# Patient Record
Sex: FEMALE | Race: WHITE | NOT HISPANIC OR LATINO | Employment: FULL TIME | URBAN - METROPOLITAN AREA
[De-identification: names, ages, dates, MRNs, and addresses within clinical notes are randomized per-mention and may not be internally consistent; named-entity substitution may affect disease eponyms.]

---

## 2017-09-29 ENCOUNTER — HOSPITAL ENCOUNTER (OUTPATIENT)
Dept: MAMMOGRAPHY | Facility: CLINIC | Age: 52
Discharge: HOME | End: 2017-09-29
Attending: FAMILY MEDICINE | Admitting: FAMILY MEDICINE
Payer: COMMERCIAL

## 2017-09-29 DIAGNOSIS — Z12.31 VISIT FOR SCREENING MAMMOGRAM: ICD-10-CM

## 2017-09-29 PROCEDURE — G0202 SCR MAMMO BI INCL CAD: HCPCS

## 2018-07-30 ENCOUNTER — OFFICE VISIT (OUTPATIENT)
Dept: FAMILY MEDICINE | Facility: CLINIC | Age: 53
End: 2018-07-30

## 2018-07-30 VITALS
BODY MASS INDEX: 41.66 KG/M2 | HEART RATE: 67 BPM | WEIGHT: 276 LBS | OXYGEN SATURATION: 99 % | TEMPERATURE: 98.2 F | SYSTOLIC BLOOD PRESSURE: 178 MMHG | DIASTOLIC BLOOD PRESSURE: 97 MMHG

## 2018-07-30 DIAGNOSIS — I10 ESSENTIAL HYPERTENSION, BENIGN: ICD-10-CM

## 2018-07-30 DIAGNOSIS — E66.01 MORBID OBESITY (H): ICD-10-CM

## 2018-07-30 LAB
ALBUMIN (URINE): ABNORMAL MG/DL
APPEARANCE UR: CLEAR
BACTERIA, UR: ABNORMAL
BILIRUB UR QL: ABNORMAL
CASTS/LPF: ABNORMAL
COLOR UR: YELLOW
EP/HPF: ABNORMAL
GLUCOSE URINE: ABNORMAL MG/DL
HGB UR QL: ABNORMAL
KETONES UR QL: ABNORMAL MG/DL
LEUKOCYTE ESTERASE - QUEST: ABNORMAL
MISC.: ABNORMAL
NITRITE UR QL STRIP: ABNORMAL
PH UR STRIP: 5.5 PH (ref 5–7)
RBC, UR MICRO: ABNORMAL (ref ?–2)
SP. GRAVITY: 1.02
UROBILINOGEN UR QL STRIP: 0.2 EU/DL (ref 0.2–1)
WBC, UR MICRO: ABNORMAL (ref ?–2)

## 2018-07-30 PROCEDURE — 99214 OFFICE O/P EST MOD 30 MIN: CPT | Performed by: FAMILY MEDICINE

## 2018-07-30 PROCEDURE — 36415 COLL VENOUS BLD VENIPUNCTURE: CPT | Performed by: FAMILY MEDICINE

## 2018-07-30 PROCEDURE — 93000 ELECTROCARDIOGRAM COMPLETE: CPT | Performed by: FAMILY MEDICINE

## 2018-07-30 PROCEDURE — 81001 URINALYSIS AUTO W/SCOPE: CPT | Performed by: FAMILY MEDICINE

## 2018-07-30 PROCEDURE — 80061 LIPID PANEL: CPT | Mod: 90 | Performed by: FAMILY MEDICINE

## 2018-07-30 PROCEDURE — 80048 BASIC METABOLIC PNL TOTAL CA: CPT | Mod: 90 | Performed by: FAMILY MEDICINE

## 2018-07-30 NOTE — MR AVS SNAPSHOT
"              After Visit Summary   7/30/2018    Michelle Grant    MRN: [id number]           Patient Information     Date Of Birth          1965        Visit Information        Provider Department      7/30/2018 4:15 PM Tara Cardenas MD [facility]        Today's Diagnoses     CARDIOVASCULAR SCREENING; LDL GOAL LESS THAN 160    -  1    Essential hypertension, benign        Morbid obesity (H)          Care Instructions    New Recombinant shingles vaccine (Shingrix): call your insurance for information on cost    Check with insurance : cologuard-     Consider buying an omron- blood cuff (large)                  Follow-ups after your visit        Who to contact     If you have questions or need follow up information about today's clinic visit or your schedule please contact [facility] directly at [phone].  Normal or non-critical lab and imaging results will be communicated to you by [employer]-Life Medicalhart, letter or phone within 4 business days after the clinic has received the results. If you do not hear from us within 7 days, please contact the clinic through [employer] or phone. If you have a critical or abnormal lab result, we will notify you by phone as soon as possible.  Submit refill requests through Coradiant or call your pharmacy and they will forward the refill request to us. Please allow 3 business days for your refill to be completed.          Additional Information About Your Visit        MyChart Information     Coradiant lets you send messages to your doctor, view your test results, renew your prescriptions, schedule appointments and more. To sign up, go to [email or web address] . Click on \"Log in\" on the left side of the screen, which will take you to the Welcome page. Then click on \"Sign up Now\" on the right side of the page.     You will be asked to enter the access code listed below, as well as some personal information. Please follow the directions to " create your username and password.     Your access code is: [email or web address]  Expires: 10/28/2018  4:06 PM     Your access code will  in 90 days. If you need help or a new code, please call your [place] clinic or [phone].        Care EveryWhere ID     This is your Care EveryWhere ID. This could be used by other organizations to access your [place] medical records  [id number]        Your Vitals Were     Pulse Temperature Pulse Oximetry BMI (Body Mass Index)          67 98.2  F (36.8  C) (Oral) 99% 41.66 kg/m2         Blood Pressure from Last 3 Encounters:   18 (!) 178/97   16 120/80   01/07/15 112/72    Weight from Last 3 Encounters:   18 125.2 kg (276 lb)   16 121.1 kg (267 lb)   01/07/15 115.2 kg (254 lb)              We Performed the Following     BASIC METABOLIC PANEL (QUEST)     EKG 12-lead complete w/read - Clinics     HCL  Urinalysis, Routine (BFP)     Lipid Profile     VENOUS COLLECTION        Primary Care Provider Office Phone # Fax #    Natasha Chen [phone]       [facility] [address] [address]        Equal Access to Services     RAZ AMARO : Hadii shelby ku hadasho Soomaali, waaxda luqadaha, qaybta kaalmada adeegyada, katie mccartney haygloria portillo . So [facility] [phone].    ATENCIÓN: Si habla español, tiene a mccarthy disposición servicios gratuitos de asistencia lingüística. Llame al [phone].    We comply with applicable federal civil rights laws and [place] laws. We do not discriminate on the basis of race, color, national origin, age, disability, sex, sexual orientation, or gender identity.            Thank you!     Thank you for choosing [facility]  for your care. Our goal is always to provide you with excellent care. Hearing back from our patients is one way we can continue to improve our services. Please take a few minutes to complete the written survey that you may receive in the mail  after your visit with us. Thank you!             Your Updated Medication List - Protect others around you: Learn how to safely use, store and throw away your medicines at www.disposemymeds.org.          This list is accurate as of 7/30/18  4:48 PM.  Always use your most recent med list.                   Brand Name Dispense Instructions for use Diagnosis    guaiFENesin 600 MG 12 hr tablet    MUCINEX    40 tablet    Take 2 tablets (1,200 mg) by mouth 2 times daily as needed for congestion    ETD (Eustachian tube dysfunction), left, Viral upper respiratory tract infection

## 2018-07-30 NOTE — PROGRESS NOTES
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- BP checked at work with an arm cuff today : 167/97    She admits that she has had elevated blood pressures int he past- particularly at dentist offices    Both mother and father with history of hypertension    Hypertension Follow-up      Outpatient blood pressures above    Low Salt Diet: no added salt/ does eat salty snacks- eating less processed food  Exercise program:exercises at community pool    Family history of hypertension: yes, mother and father  Family history of heart disease: father: ASCVD- mother atrial fibrillation  Father has memory loss  Tobacco use: no  Headaches: no  Chest pain: occasional symptoms- has been going to [employer], no pain with running in pool  Kidney disease:no  Lipid 2015:low HDL  No unusual stress    Last eye exam: one year ago- no retinal changes that patient is aware of      Problem list and histories reviewed & adjusted, as indicated.  Additional history: as documented  Seen by neurology as a young adult after starting birth control pill- diagnosed with pre-migraine syndrome - symptoms resolved with DC of BCP       Patient Active Problem List   Diagnosis     CARDIOVASCULAR SCREENING; LDL GOAL LESS THAN 160     Health Care Home     ACP (advance care planning)     Morbid obesity (H)     Past Surgical History:   Procedure Laterality Date     C EXCIS UTERINE FIBROID,ABD APPRCH  1991     C TOTAL ABDOM HYSTERECTOMY  2010    Hysterectomy ovaries left in     HC LAPAROSCOPY, SURGICAL; CHOLECYSTECTOMY  2003    Cholecystectomy, Laparoscopic       Social History   Substance Use Topics     Smoking status: Never Smoker     Smokeless tobacco: Never Used     Alcohol use 0.5 oz/week     1 drink(s) per week      Comment: rare     Family History   Problem Relation Age of Onset     GASTROINTESTINAL  DISEASE Mother      Hypertension Mother      Lipids Mother      C.A.D. Father      Hypertension Father      Lipids Father      Neurologic Disorder Father      memory issues unknown etiology     Cancer - colorectal Other          Current Outpatient Prescriptions   Medication Sig Dispense Refill     guaiFENesin (MUCINEX) 600 MG 12 hr tablet Take 2 tablets (1,200 mg) by mouth 2 times daily as needed for congestion 40 tablet 0       Reviewed and updated as needed this visit by clinical staff       Reviewed and updated as needed this visit by Provider         ROS:      OBJECTIVE:     BP (!) 178/97 (BP Location: Left arm, Patient Position: Sitting, Cuff Size: Adult Large)  Pulse 67  Temp 98.2  F (36.8  C) (Oral)  Wt 125.2 kg (276 lb)  SpO2 99%  BMI 41.66 kg/m2  Body mass index is 41.66 kg/(m^2).   GENERAL: healthy, alert and no distress  NECK: no adenopathy, no asymmetry, masses, or scars and thyroid normal to palpation  RESP: lungs clear to auscultation - no rales, rhonchi or wheezes  CV: regular rate and rhythm, normal S1 S2, no S3 or S4, no murmur, click or rub, no peripheral edema and peripheral pulses strong  MS: no gross musculoskeletal defects noted, no edema    Diagnostic Test Results:  Results for orders placed or performed in visit on 07/30/18 (from the past 24 hour(s))   BASIC METABOLIC PANEL (QUEST)   Result Value Ref Range    Glucose 105 (H) 65 - 99 mg/dL    Urea Nitrogen 17 7 - 25 mg/dL    Creatinine 0.88 0.50 - 1.05 mg/dL    GFR Estimate 76 > OR = 60 mL/min/1.73m2    EGFR African American 88 > OR = 60 mL/min/1.73m2    BUN/Creatinine Ratio NOT APPLICABLE 6 - 22 (calc)    Sodium 141 135 - 146 mmol/L    Potassium 4.1 3.5 - 5.3 mmol/L    Chloride 106 98 - 110 mmol/L    Carbon Dioxide 26 20 - 31 mmol/L    Calcium 9.6 8.6 - 10.4 mg/dL   Lipid Profile   Result Value Ref Range    Cholesterol 163 <200 mg/dL    HDL Cholesterol 38 (L) >50 mg/dL    Triglycerides 203 (H) <150 mg/dL    LDL Cholesterol Calculated 96  "mg/dL (calc)    Cholesterol/HDL Ratio 4.3 <5.0 (calc)    Non HDL Cholesterol 125 <130 mg/dL (calc)   HCL  Urinalysis, Routine (BFP)   Result Value Ref Range    Color Urine Yellow     Appearance Urine Clear     Glucose Urine Neg neg mg/dL    Bilirubin Urine Neg neg    Ketones Urine Neg neg mg/dL    Specific Gravity 1.020     Blood Urine Neg neg    pH Urine 5.5 5.0 - 7.0 pH    Albumin Urine Neg neg - neg mg/dL    Urobilinogen Urine 0.2 0.2 - 1.0 EU/dL    Nitrite Urine Neg NEG    Leukocyte Esterase Small (A) neg - neg    Wbc, Urine Micro 0-3 (A) neg - 2    RBC Micro Urine 2-6 (A) neg - 2    EP/HPF Mod     Bacteria Urine Mod (A) neg - neg    Casts/LPF Neg     Miscellaneous Neg        ASSESSMENT/PLAN:   (I10) Essential hypertension, benign  Comment: new diagnosis  Plan: BASIC METABOLIC PANEL (QUEST), Lipid Profile,         VENOUS COLLECTION, EKG 12-lead complete w/read         - Clinics, HCL  Urinalysis, Routine (BFP),         lisinopril (PRINIVIL/ZESTRIL) 10 MG tablet          EKG is normal  UA; normal    (E66.01) Morbid obesity (H)  Comment: healthy lifestyle encouraged  Plan: BASIC METABOLIC PANEL (QUEST), Lipid Profile,         VENOUS COLLECTION  BMI:   Estimated body mass index is 41.66 kg/(m^2) as calculated from the following:    Height as of 5/17/16: 1.734 m (5' 8.25\").    Weight as of this encounter: 125.2 kg (276 lb).   Weight management plan: Discussed healthy diet and exercise guidelines and patient will follow up in 6 months in clinic to re-evaluate.      Follow up in 3-4 weeks  Lisinopril - new RX  Discussed possible side effects- need for follow up to monitor renal function    Tara Cardenas MD  [facility]    "

## 2018-07-30 NOTE — PATIENT INSTRUCTIONS
New Recombinant shingles vaccine (Shingrix): call your insurance for information on cost    Check with insurance : joelle-     Consider buying an omron- blood cuff (large)

## 2018-07-30 NOTE — NURSING NOTE
Michelle is here for higher blood pressure readings. She was at the dentist last Wednesday where she had higher readings and took it again at work to and still had a higher reading. She was told by her mom that high blood pressure does run in the family.    Pre-visit Screening:  Immunizations:  up to date  Colonoscopy:  is due and will be scheduling at a later time  Mammogram: is up to date  Asthma Action Test/Plan:  No concerns  PHQ9:  NA  GAD7:  NA  Questioned patient about current smoking habits Pt. has never smoked.  Ok to leave detailed message on voice mail for today's visit only Yes, phone # [phone]

## 2018-07-31 LAB
BUN SERPL-MCNC: 17 MG/DL (ref 7–25)
BUN/CREATININE RATIO: ABNORMAL (CALC) (ref 6–22)
CALCIUM SERPL-MCNC: 9.6 MG/DL (ref 8.6–10.4)
CHLORIDE SERPLBLD-SCNC: 106 MMOL/L (ref 98–110)
CHOLEST SERPL-MCNC: 163 MG/DL
CHOLEST/HDLC SERPL: 4.3 (CALC)
CO2 SERPL-SCNC: 26 MMOL/L (ref 20–31)
CREAT SERPL-MCNC: 0.88 MG/DL (ref 0.5–1.05)
EGFR AFRICAN AMERICAN - QUEST: 88 ML/MIN/1.73M2
GFR SERPL CREATININE-BSD FRML MDRD: 76 ML/MIN/1.73M2
GLUCOSE - QUEST: 105 MG/DL (ref 65–99)
HDLC SERPL-MCNC: 38 MG/DL
LDLC SERPL CALC-MCNC: 96 MG/DL (CALC)
NONHDLC SERPL-MCNC: 125 MG/DL (CALC)
POTASSIUM SERPL-SCNC: 4.1 MMOL/L (ref 3.5–5.3)
SODIUM SERPL-SCNC: 141 MMOL/L (ref 135–146)
TRIGL SERPL-MCNC: 203 MG/DL

## 2018-07-31 RX ORDER — LISINOPRIL 10 MG/1
10 TABLET ORAL DAILY
Qty: 30 TABLET | Refills: 1 | Status: SHIPPED | OUTPATIENT
Start: 2018-07-31 | End: 2019-06-04

## 2018-08-03 ENCOUNTER — HEALTH MAINTENANCE LETTER (OUTPATIENT)
Age: 53
End: 2018-08-03

## 2018-08-27 ENCOUNTER — OFFICE VISIT (OUTPATIENT)
Dept: FAMILY MEDICINE | Facility: CLINIC | Age: 53
End: 2018-08-27

## 2018-08-27 VITALS
BODY MASS INDEX: 41.21 KG/M2 | OXYGEN SATURATION: 99 % | TEMPERATURE: 98.1 F | DIASTOLIC BLOOD PRESSURE: 74 MMHG | SYSTOLIC BLOOD PRESSURE: 122 MMHG | WEIGHT: 273 LBS | HEART RATE: 64 BPM

## 2018-08-27 DIAGNOSIS — I10 BENIGN ESSENTIAL HYPERTENSION: Primary | ICD-10-CM

## 2018-08-27 PROCEDURE — 99212 OFFICE O/P EST SF 10 MIN: CPT | Performed by: FAMILY MEDICINE

## 2018-08-27 PROCEDURE — 80048 BASIC METABOLIC PNL TOTAL CA: CPT | Mod: 90 | Performed by: FAMILY MEDICINE

## 2018-08-27 PROCEDURE — 36415 COLL VENOUS BLD VENIPUNCTURE: CPT | Performed by: FAMILY MEDICINE

## 2018-08-27 RX ORDER — LISINOPRIL 10 MG/1
10 TABLET ORAL DAILY
Qty: 90 TABLET | Refills: 3 | Status: SHIPPED | OUTPATIENT
Start: 2018-08-27 | End: 2019-06-04

## 2018-08-27 NOTE — PROGRESS NOTES
SUBJECTIVE:   Michelle Grant is a 53 year old female who presents to clinic today for the following health issues:       Hypertension Follow-up      Outpatient blood pressures are being checked at home.  Results are 50% in goal range.      Amount of exercise or physical activity: she has been exercising in the pool- going to the gym every day    Problems taking medications regularly: No    Medication side effects: none  Side effects: A little light headed the first day that she took lisinopril- this has resolved     PROBLEMS TO ADD ON...  Obesity: down three pounds in the last month    Problem list and histories reviewed & adjusted, as indicated.  Additional history: as documented    Patient Active Problem List   Diagnosis     CARDIOVASCULAR SCREENING; LDL GOAL LESS THAN 160     Health Care Home     ACP (advance care planning)     Morbid obesity (H)     Past Surgical History:   Procedure Laterality Date     C EXCIS UTERINE FIBROID,ABD APPRCH  1991     C TOTAL ABDOM HYSTERECTOMY  2010    Hysterectomy ovaries left in     HC LAPAROSCOPY, SURGICAL; CHOLECYSTECTOMY  2003    Cholecystectomy, Laparoscopic       Social History   Substance Use Topics     Smoking status: Never Smoker     Smokeless tobacco: Never Used     Alcohol use 0.5 oz/week     1 drink(s) per week      Comment: rare     Family History   Problem Relation Age of Onset     GASTROINTESTINAL DISEASE Mother      Hypertension Mother      Lipids Mother      C.A.D. Father      Hypertension Father      Lipids Father      Neurologic Disorder Father      memory issues unknown etiology     Cancer - colorectal Other          Current Outpatient Prescriptions   Medication Sig Dispense Refill     lisinopril (PRINIVIL/ZESTRIL) 10 MG tablet Take 1 tablet (10 mg) by mouth daily 90 tablet 3     lisinopril (PRINIVIL/ZESTRIL) 10 MG tablet Take 1 tablet (10 mg) by mouth daily 30 tablet 1       Reviewed and updated as needed this visit by clinical staff  Tobacco  Allergies   Meds       Reviewed and updated as needed this visit by Provider         ROS:  CONSTITUTIONAL: NEGATIVE for fever, chills, change in weight  ENT/MOUTH: NEGATIVE for ear, mouth and throat problems  RESP: NEGATIVE for significant cough or SOB  CV: NEGATIVE for chest pain, palpitations or peripheral edema    ? Dry cough side effect - only at night    Hysterectomy: for fibroid- Dr Petty  Regular mammograms  Menopause symptoms-    Less aches and pains  Plantar fasciitis     ggm had colon cancer-      OBJECTIVE:     /74 (BP Location: Right arm, Patient Position: Sitting, Cuff Size: Adult Large)  Pulse 64  Temp 98.1  F (36.7  C) (Oral)  Wt 123.8 kg (273 lb)  SpO2 99%  BMI 41.21 kg/m2  Body mass index is 41.21 kg/(m^2).     122/80-   GENERAL: healthy, alert and no distress  NECK: no adenopathy, no asymmetry, masses, or scars and thyroid normal to palpation  RESP: lungs clear to auscultation - no rales, rhonchi or wheezes  CV: regular rate and rhythm, normal S1 S2, no S3 or S4, no murmur, click or rub, no peripheral edema and peripheral pulses strong  MS: no gross musculoskeletal defects noted, no edema    Diagnostic Test Results:  Results for orders placed or performed in visit on 08/27/18   BASIC METABOLIC PANEL (QUEST)   Result Value Ref Range    Glucose 100 (H) 65 - 99 mg/dL    Urea Nitrogen 18 7 - 25 mg/dL    Creatinine 0.84 0.50 - 1.05 mg/dL    GFR Estimate 80 > OR = 60 mL/min/1.73m2    EGFR African American 93 > OR = 60 mL/min/1.73m2    BUN/Creatinine Ratio NOT APPLICABLE 6 - 22 (calc)    Sodium 139 135 - 146 mmol/L    Potassium 3.9 3.5 - 5.3 mmol/L    Chloride 107 98 - 110 mmol/L    Carbon Dioxide 20 20 - 32 mmol/L    Calcium 9.3 8.6 - 10.4 mg/dL       ASSESSMENT/PLAN:        (I10) Benign essential hypertension  (primary encounter diagnosis)  Comment: Blood pressure improved  Lifestyle changes implemented  Supported her efforts   Lisinopril refilled at her current dose for one year.  Plan: lisinopril  (PRINIVIL/ZESTRIL) 10 MG tablet,         BASIC METABOLIC PANEL (QUEST), VENOUS         COLLECTION           Tara Cardenas MD  [facility]

## 2018-08-27 NOTE — MR AVS SNAPSHOT
After Visit Summary   8/27/2018    Michelle Grant    MRN: [id number]           Patient Information     Date Of Birth          1965        Visit Information        Provider Department      8/27/2018 4:15 PM Tara Cardenas MD [facility]        Today's Diagnoses     Benign essential hypertension    -  1      Care Instructions    New Recombinant shingles vaccine (Shingrix): call your insurance for information on cost    Call insurance about cologuard          Follow-ups after your visit        Who to contact     If you have questions or need follow up information about today's clinic visit or your schedule please contact [facility] directly at [phone].  Normal or non-critical lab and imaging results will be communicated to you by O'ol Bluehart, letter or phone within 4 business days after the clinic has received the results. If you do not hear from us within 7 days, please contact the clinic through O'[employer] Bluehart or phone. If you have a critical or abnormal lab result, we will notify you by phone as soon as possible.  Submit refill requests through YooLotto or call your pharmacy and they will forward the refill request to us. Please allow 3 business days for your refill to be completed.          Additional Information About Your Visit        MyChart Information     Y[employer]Lotto gives you secure access to your electronic health record. If you see a primary care provider, you can also send messages to your care team and make appointments. If you have questions, please call your primary care clinic.  If you do not have a primary care provider, please call [phone] and they will assist you.        Care EveryWhere ID     This is your Care EveryWhere ID. This could be used by other organizations to access your [place] medical records  [id number]        Your Vitals Were     Pulse Temperature Pulse Oximetry BMI (Body Mass Index)          64 98.1  F (36.7  C)  (Oral) 99% 41.21 kg/m2         Blood Pressure from Last 3 Encounters:   08/27/18 122/74   07/30/18 (!) 178/97   05/17/16 120/80    Weight from Last 3 Encounters:   08/27/18 123.8 kg (273 lb)   07/30/18 125.2 kg (276 lb)   05/17/16 121.1 kg (267 lb)              Today, you had the following     No orders found for display         Today's Medication Changes          These changes are accurate as of 8/27/18  4:49 PM.  If you have any questions, ask your nurse or doctor.               These medicines have changed or have updated prescriptions.        Dose/Directions    * lisinopril 10 MG tablet   Commonly known as:  PRINIVIL/ZESTRIL   This may have changed:  Another medication with the same name was added. Make sure you understand how and when to take each.   Used for:  Essential hypertension, benign   Changed by:  Tara Cardenas MD        Dose:  10 mg   Take 1 tablet (10 mg) by mouth daily   Quantity:  30 tablet   Refills:  1       * lisinopril 10 MG tablet   Commonly known as:  PRINIVIL/ZESTRIL   This may have changed:  You were already taking a medication with the same name, and this prescription was added. Make sure you understand how and when to take each.   Used for:  Benign essential hypertension   Changed by:  Tara Cardenas MD        Dose:  10 mg   Take 1 tablet (10 mg) by mouth daily   Quantity:  90 tablet   Refills:  3       * Notice:  This list has 2 medication(s) that are the same as other medications prescribed for you. Read the directions carefully, and ask your doctor or other care provider to review them with you.         Where to get your medicines      These medications were sent to [facility] [address] [facility] [employer] [facility] [place] [facility] [address]  [address] [facility] [address]     Phone:  [phone]     lisinopril 10 MG tablet                Primary Care Provider Office Phone # Fax #    Natasha Chen [phone]       [facility] [address]  [place] [address]        Equal Access to Services     [facility] : Hadii shelby adames arabella Marie, wagarryda lukenroysarahha, rae tamikoyasmeendm gordonbrendadm, katie mccartney regancarrillo healymirianfamilia bassett. So [facility] [phone].    ATENCIÓN: Si habla español, tiene a mccarthy disposición servicios gratuitos de asistencia lingüística. Llame al [phone].    We comply with applicable federal civil rights laws and [place] laws. We do not discriminate on the basis of race, color, national origin, age, disability, sex, sexual orientation, or gender identity.            Thank you!     Thank you for choosing [facility]  for your care. Our goal is always to provide you with excellent care. Hearing back from our patients is one way we can continue to improve our services. Please take a few minutes to complete the written survey that you may receive in the mail after your visit with us. Thank you!             Your Updated Medication List - Protect others around you: Learn how to safely use, store and throw away your medicines at www.disposemymeds.org.          This list is accurate as of 8/27/18  4:49 PM.  Always use your most recent med list.                   Brand Name Dispense Instructions for use Diagnosis    * lisinopril 10 MG tablet    PRINIVIL/ZESTRIL    30 tablet    Take 1 tablet (10 mg) by mouth daily    Essential hypertension, benign       * lisinopril 10 MG tablet    PRINIVIL/ZESTRIL    90 tablet    Take 1 tablet (10 mg) by mouth daily    Benign essential hypertension       * Notice:  This list has 2 medication(s) that are the same as other medications prescribed for you. Read the directions carefully, and ask your doctor or other care provider to review them with you.

## 2018-08-27 NOTE — NURSING NOTE
Michelle is here for blood pressure follow up.     Pre-visit Screening:  Immunizations:  up to date  Colonoscopy:  is due and to be scheduled by patient for later completion  Mammogram: is up to date-coming up due in late September   Asthma Action Test/Plan:  No concerns  PHQ9:  PHQ-2 done today   GAD7:  No concerns  Questioned patient about current smoking habits Pt. has never smoked.  Ok to leave detailed message on voice mail for today's visit only Yes, phone # [phone]

## 2018-08-27 NOTE — PATIENT INSTRUCTIONS
New Recombinant shingles vaccine (Shingrix): call your insurance for information on cost    [facility]

## 2018-08-28 LAB
BUN SERPL-MCNC: 18 MG/DL (ref 7–25)
BUN/CREATININE RATIO: ABNORMAL (CALC) (ref 6–22)
CALCIUM SERPL-MCNC: 9.3 MG/DL (ref 8.6–10.4)
CHLORIDE SERPLBLD-SCNC: 107 MMOL/L (ref 98–110)
CO2 SERPL-SCNC: 20 MMOL/L (ref 20–32)
CREAT SERPL-MCNC: 0.84 MG/DL (ref 0.5–1.05)
EGFR AFRICAN AMERICAN - QUEST: 93 ML/MIN/1.73M2
GFR SERPL CREATININE-BSD FRML MDRD: 80 ML/MIN/1.73M2
GLUCOSE - QUEST: 100 MG/DL (ref 65–99)
POTASSIUM SERPL-SCNC: 3.9 MMOL/L (ref 3.5–5.3)
SODIUM SERPL-SCNC: 139 MMOL/L (ref 135–146)

## 2018-11-30 ENCOUNTER — HEALTH MAINTENANCE LETTER (OUTPATIENT)
Age: 53
End: 2018-11-30

## 2019-03-18 ENCOUNTER — HOSPITAL ENCOUNTER (OUTPATIENT)
Dept: MAMMOGRAPHY | Facility: CLINIC | Age: 54
Discharge: HOME | End: 2019-03-18
Attending: FAMILY MEDICINE | Admitting: FAMILY MEDICINE
Payer: COMMERCIAL

## 2019-03-18 DIAGNOSIS — Z12.31 VISIT FOR SCREENING MAMMOGRAM: ICD-10-CM

## 2019-03-18 PROCEDURE — 77067 SCR MAMMO BI INCL CAD: CPT

## 2019-06-04 ENCOUNTER — OFFICE VISIT (OUTPATIENT)
Dept: FAMILY MEDICINE | Facility: CLINIC | Age: 54
End: 2019-06-04

## 2019-06-04 VITALS
RESPIRATION RATE: 16 BRPM | HEIGHT: 68 IN | TEMPERATURE: 98.2 F | SYSTOLIC BLOOD PRESSURE: 137 MMHG | BODY MASS INDEX: 40.92 KG/M2 | HEART RATE: 68 BPM | OXYGEN SATURATION: 99 % | WEIGHT: 270 LBS | DIASTOLIC BLOOD PRESSURE: 88 MMHG

## 2019-06-04 DIAGNOSIS — R00.2 PALPITATIONS: ICD-10-CM

## 2019-06-04 DIAGNOSIS — E66.01 MORBID OBESITY (H): ICD-10-CM

## 2019-06-04 DIAGNOSIS — I10 BENIGN ESSENTIAL HYPERTENSION: Primary | ICD-10-CM

## 2019-06-04 DIAGNOSIS — Z79.899 ENCOUNTER FOR LONG-TERM (CURRENT) USE OF MEDICATIONS: ICD-10-CM

## 2019-06-04 LAB
% GRANULOCYTES: 68.9 %
HCT VFR BLD AUTO: 40.6 % (ref 35–47)
HEMOGLOBIN: 13 G/DL (ref 11.7–15.7)
LYMPHOCYTES NFR BLD AUTO: 22.6 %
MCH RBC QN AUTO: 29.6 PG (ref 26–33)
MCHC RBC AUTO-ENTMCNC: 32 G/DL (ref 31–36)
MCV RBC AUTO: 92.4 FL (ref 78–100)
MONOCYTES NFR BLD AUTO: 8.5 %
PLATELET COUNT - QUEST: 228 10^9/L (ref 150–375)
RBC # BLD AUTO: 4.39 10*12/L (ref 3.8–5.2)
WBC # BLD AUTO: 7.8 10*9/L (ref 4–11)

## 2019-06-04 PROCEDURE — 85025 COMPLETE CBC W/AUTO DIFF WBC: CPT | Performed by: FAMILY MEDICINE

## 2019-06-04 PROCEDURE — 36415 COLL VENOUS BLD VENIPUNCTURE: CPT | Performed by: FAMILY MEDICINE

## 2019-06-04 PROCEDURE — 99214 OFFICE O/P EST MOD 30 MIN: CPT | Performed by: FAMILY MEDICINE

## 2019-06-04 RX ORDER — LISINOPRIL 10 MG/1
20 TABLET ORAL DAILY
COMMUNITY
Start: 2019-06-04 | End: 2019-07-02

## 2019-06-04 SDOH — ECONOMIC STABILITY: TRANSPORTATION INSECURITY
IN THE PAST 12 MONTHS, HAS LACK OF TRANSPORTATION KEPT YOU FROM MEETINGS, WORK, OR FROM GETTING THINGS NEEDED FOR DAILY LIVING?: NO

## 2019-06-04 SDOH — ECONOMIC STABILITY: TRANSPORTATION INSECURITY
IN THE PAST 12 MONTHS, HAS THE LACK OF TRANSPORTATION KEPT YOU FROM MEDICAL APPOINTMENTS OR FROM GETTING MEDICATIONS?: NO

## 2019-06-04 SDOH — ECONOMIC STABILITY: INCOME INSECURITY: HOW HARD IS IT FOR YOU TO PAY FOR THE VERY BASICS LIKE FOOD, HOUSING, MEDICAL CARE, AND HEATING?: NOT HARD AT ALL

## 2019-06-04 ASSESSMENT — MIFFLIN-ST. JEOR: SCORE: 1878.21

## 2019-06-04 NOTE — PATIENT INSTRUCTIONS
Benign essential hypertension  (primary encounter diagnosis)  Plan: lisinopril (PRINIVIL/ZESTRIL) 10 MG tablet,         Comprehensive Metobolic Panel (BFP), VENOUS         COLLECTION        1)  Medication: dosage change: 20 mgm daily  2)  Dietary sodium restriction  3)  Regular aerobic exercise  4)  Recheck in 5 weeks, sooner should new symptoms or   problems arise.    Patient Education: Reviewed risks of hypertension and principles of   treatment.        (R00.2) Palpitations  Plan: TSH with free T4 reflex (QUEST), VENOUS         COLLECTION, CL AFF HEMOGRAM/PLATE/DIFF (BFP)        Monitor response    (E66.01) Morbid obesity (H)  Plan: Diet changes encouraged/ low salt

## 2019-06-04 NOTE — NURSING NOTE
Michelle is here for a bp check.        Pre-visit Screening:  Immunizations:  up to date  Colonoscopy:  is due and to be scheduled by patient for later completion  Mammogram: NA  Asthma Action Test/Plan:  NA  PHQ9:  None  GAD7:  None  Questioned patient about current smoking habits Pt. has never smoked.  Ok to leave detailed message on voice mail for today's visit only Yes, phone # [phone]

## 2019-06-04 NOTE — PROGRESS NOTES
"  SUBJECTIVE:  Michelle Grant is an 54 year old female who presents for evaluation of   hypertension. She indicates that she is feeling well and   denies any symptoms referable to her elevated blood pressure.   Specifically denies chest pain, palpitations, dyspnea, orthopnea,   PND or peripheral edema. Current medication regimen is as listed   below. Patient denies any side effects of medication.    2 weeks ago BP noted elevated but fluctuating. Lately mildly elevated.    Family history: positive for hypertension, cardiovascular disease and elevated cholesterol  Age at onset of elevated blood pressure: 7/2018 at the dentists office/ seen here and started on medications.  Normal EKG and labs 8/2018.    Cardiovascular risk factors: family history, hypertension, obesity and sedentary life style  Use of agents associated with hypertension: none  History of renal disease: negative  History of flank trauma: negative    Current Outpatient Medications   Medication     lisinopril (PRINIVIL/ZESTRIL) 10 MG tablet     No current facility-administered medications for this visit.      No Known Allergies    Social History     Tobacco Use     Smoking status: Never Smoker     Smokeless tobacco: Never Used   Substance Use Topics     Alcohol use: Yes     Alcohol/week: 0.5 oz     Types: 1 drink(s) per week     Comment: rare       OBJECTIVE:  /88 (BP Location: Right arm, Patient Position: Sitting, Cuff Size: Adult Large)   Pulse 68   Temp 98.2  F (36.8  C) (Oral)   Ht 1.727 m (5' 8\")   Wt 122.5 kg (270 lb)   SpO2 99%   BMI 41.05 kg/m    Repeat BP R arm seated = 137/88  with large size cuff.  Multiple readings above 140/90  Fundi: deferred  Thyroid: normal to inspection and palpation  Lungs: negative, Percussion normal. Good diaphragmatic excursion. Lungs clear  Heart: negative, PMI normal. No lifts, heaves, or thrills. RRR. No murmurs, clicks gallops or rub  Peripheral pulses: radial=4/4, femoral=4/4, popliteal=4/4, " dorsalis pedis=4/4,  Abd; The abdomen is soft without tenderness, guarding, mass or organomegaly. Bowel sounds are normal. No CVA tenderness or inguinal adenopathy noted.  BMI : Body mass index is 41.05 kg/m .    ASSESSMENT:(I10) Benign essential hypertension  (primary encounter diagnosis)  Plan: lisinopril (PRINIVIL/ZESTRIL) 10 MG tablet,         Comprehensive Metobolic Panel (BFP), VENOUS         COLLECTION        1)  Medication: dosage change: 20 mgm daily  2)  Dietary sodium restriction  3)  Regular aerobic exercise  4)  Recheck in 5 weeks, sooner should new symptoms or   problems arise.    Patient Education: Reviewed risks of hypertension and principles of   treatment.        (R00.2) Palpitations  Plan: TSH with free T4 reflex ([facility]), VENOUS         COLLECTION, CL AFF HEMOGRAM/PLATE/DIFF (BFP)        Monitor response    (E66.01) Morbid obesity (H)  Plan: Diet changes encouraged/ low salt    (Z79.899) Encounter for long-term (current) use of medications  Plan: Comprehensive Metobolic Panel (BFP), TSH with         free T4 reflex ([facility]), VENOUS COLLECTION

## 2019-06-05 LAB — TSH SERPL-ACNC: 1.25 MIU/L

## 2019-06-06 LAB — GLUCOSE - QUEST: NORMAL MG/DL

## 2019-06-07 LAB
ALBUMIN SERPL-MCNC: 4.3 G/DL (ref 3.6–5.1)
ALP SERPL-CCNC: 96 U/L (ref 40–115)
ALT 1742-6: 14 U/L (ref 9–46)
AST 1920-8: 8 U/L (ref 10–35)
CO2 SERPL-SCNC: 18.6 MMOL/L (ref 20–32)
CREAT SERPL-MCNC: 1.02 MG/DL (ref 0.7–1.18)
GLUCOSE SERPL-MCNC: 105 MG/DL (ref 60–99)
PROT SERPL-MCNC: 6.9 G/DL (ref 6.1–8.1)

## 2019-07-02 ENCOUNTER — OFFICE VISIT (OUTPATIENT)
Dept: FAMILY MEDICINE | Facility: CLINIC | Age: 54
End: 2019-07-02

## 2019-07-02 VITALS
SYSTOLIC BLOOD PRESSURE: 134 MMHG | WEIGHT: 269.2 LBS | BODY MASS INDEX: 40.8 KG/M2 | TEMPERATURE: 98.9 F | RESPIRATION RATE: 16 BRPM | HEIGHT: 68 IN | HEART RATE: 93 BPM | OXYGEN SATURATION: 98 % | DIASTOLIC BLOOD PRESSURE: 82 MMHG

## 2019-07-02 DIAGNOSIS — I10 BENIGN ESSENTIAL HYPERTENSION: Primary | ICD-10-CM

## 2019-07-02 DIAGNOSIS — Z23 NEED FOR DIPHTHERIA-TETANUS-PERTUSSIS (TDAP) VACCINE: ICD-10-CM

## 2019-07-02 PROCEDURE — 90471 IMMUNIZATION ADMIN: CPT | Performed by: FAMILY MEDICINE

## 2019-07-02 PROCEDURE — 99213 OFFICE O/P EST LOW 20 MIN: CPT | Mod: 25 | Performed by: FAMILY MEDICINE

## 2019-07-02 PROCEDURE — 90714 TD VACC NO PRESV 7 YRS+ IM: CPT | Performed by: FAMILY MEDICINE

## 2019-07-02 RX ORDER — LISINOPRIL 20 MG/1
20 TABLET ORAL DAILY
Qty: 90 TABLET | Refills: 1 | Status: SHIPPED | OUTPATIENT
Start: 2019-07-02 | End: 2019-12-19

## 2019-07-02 ASSESSMENT — MIFFLIN-ST. JEOR: SCORE: 1874.58

## 2019-07-02 NOTE — NURSING NOTE
Michelle is here today for a BP recheck.    Pre-visit Screening:  Immunizations:  up to date  Colonoscopy:  is due and to be scheduled by patient for later completion  Mammogram: is up to date  Asthma Action Test/Plan:  NA  PHQ9:  PHQ 2 done today  GAD7:  NA  Questioned patient about current smoking habits Pt. has never smoked.  Ok to leave detailed message on voice mail for today's visit only Yes, phone # [phone]

## 2019-07-02 NOTE — PATIENT INSTRUCTIONS
1)  Medication: continue current medication regimen unchanged  2)  Dietary sodium restriction  3)  Regular aerobic exercise  4)  Recheck in 6 months fasting, sooner should new symptoms or   problems arise.    Patient Education: Reviewed risks of hypertension and principles of   treatment.

## 2019-07-02 NOTE — PROGRESS NOTES
"  SUBJECTIVE:  Michelle Grant is an 54 year old female who presents for evaluation of   Hypertension after starting medication last month. She indicates that she is feeling well and   denies any symptoms referable to her elevated blood pressure.   Specifically denies chest pain, palpitations, dyspnea, orthopnea,   PND or peripheral edema. Current medication regimen is as listed   below. Patient denies any side effects of medication.    Family history: positive for hypertension, cardiovascular disease and elevated cholesterol  Age at onset of elevated blood pressure: 54  Cardiovascular risk factors: family history, hypertension, obesity and sedentary life style  Use of agents associated with hypertension: none  History of renal disease: negative  History of flank trauma: negative    Current Outpatient Medications   Medication     lisinopril (PRINIVIL/ZESTRIL) 10 MG tablet     No current facility-administered medications for this visit.      No Known Allergies    Social History     Tobacco Use     Smoking status: Never Smoker     Smokeless tobacco: Never Used   Substance Use Topics     Alcohol use: Yes     Alcohol/week: 0.5 oz     Types: 1 drink(s) per week     Comment: rare       OBJECTIVE:  /82 (BP Location: Right arm, Patient Position: Sitting, Cuff Size: Adult Large)   Pulse 93   Temp 98.9  F (37.2  C) (Oral)   Ht 1.727 m (5' 8\")   Wt 122.1 kg (269 lb 3.2 oz)   SpO2 98%   BMI 40.93 kg/m    Repeat BP R arm seated = 134/82 with regular size cuff.  Fundi: deferred  Thyroid: normal to inspection and palpation  Lungs: negative, Percussion normal. Good diaphragmatic excursion. Lungs clear  Heart: negative, PMI normal. No lifts, heaves, or thrills. RRR. No murmurs, clicks gallops or rub  Peripheral pulses: radial=4/4, femoral=4/4, popliteal=4/4, dorsalis pedis=4/4,    ASSESSMENT:  (I10) Benign essential hypertension  (primary encounter diagnosis)  Plan: lisinopril (PRINIVIL/ZESTRIL) 20 MG tablet        1)  " Medication: continue current medication regimen unchanged  2)  Dietary sodium restriction  3)  Regular aerobic exercise  4)  Recheck in 6 months fasting, sooner should new symptoms or   problems arise.    Patient Education: Reviewed risks of hypertension and principles of   treatment.        (Z23) Need for diphtheria-tetanus-pertussis (Tdap) vaccine  Plan: C TD PRSERV FREE >=7 YRS ADS IM

## 2019-08-24 DIAGNOSIS — I10 BENIGN ESSENTIAL HYPERTENSION: ICD-10-CM

## 2019-08-24 RX ORDER — LISINOPRIL 10 MG/1
TABLET ORAL
Qty: 90 TABLET | Refills: 3 | OUTPATIENT
Start: 2019-08-24

## 2019-08-24 NOTE — TELEPHONE ENCOUNTER
Refused Prescriptions:                       Disp   Refills    lisinopril (PRINIVIL/ZESTRIL) 10 MG tablet*90 tab*3        Sig: TAKE 1 TABLET BY MOUTH EVERY DAY  Refused By: JOYCE DEAL  Reason for Refusal: Adjustment in Therapy    Refilled 7-2-2019  Joyce

## 2019-12-08 ENCOUNTER — HEALTH MAINTENANCE LETTER (OUTPATIENT)
Age: 54
End: 2019-12-08

## 2019-12-12 DIAGNOSIS — I10 BENIGN ESSENTIAL HYPERTENSION: ICD-10-CM

## 2019-12-12 RX ORDER — LISINOPRIL 20 MG/1
TABLET ORAL
Qty: 90 TABLET | Refills: 1 | OUTPATIENT
Start: 2019-12-12

## 2019-12-12 NOTE — TELEPHONE ENCOUNTER
Refused Prescriptions:                       Disp   Refills    lisinopril (PRINIVIL/ZESTRIL) 20 MG tablet*90 tab*1        Sig: TAKE 1 TABLET BY MOUTH EVERY DAY  Refused By: JOYCE DEAL  Reason for Refusal: Patient needs appointment    Pt due in Jan 2020  For a med check per notes on 7-2019 rtc in six months  No future appts  Joyce

## 2019-12-19 ENCOUNTER — OFFICE VISIT (OUTPATIENT)
Dept: FAMILY MEDICINE | Facility: CLINIC | Age: 54
End: 2019-12-19

## 2019-12-19 VITALS
DIASTOLIC BLOOD PRESSURE: 82 MMHG | WEIGHT: 258.8 LBS | OXYGEN SATURATION: 99 % | HEIGHT: 68 IN | RESPIRATION RATE: 18 BRPM | TEMPERATURE: 97.9 F | SYSTOLIC BLOOD PRESSURE: 130 MMHG | HEART RATE: 72 BPM | BODY MASS INDEX: 39.22 KG/M2

## 2019-12-19 DIAGNOSIS — Z13.1 SCREENING FOR DIABETES MELLITUS: ICD-10-CM

## 2019-12-19 DIAGNOSIS — Z79.899 ENCOUNTER FOR LONG-TERM (CURRENT) USE OF MEDICATIONS: ICD-10-CM

## 2019-12-19 DIAGNOSIS — E66.01 MORBID OBESITY (H): ICD-10-CM

## 2019-12-19 DIAGNOSIS — I10 BENIGN ESSENTIAL HYPERTENSION: Primary | ICD-10-CM

## 2019-12-19 DIAGNOSIS — Z13.220 SCREENING CHOLESTEROL LEVEL: ICD-10-CM

## 2019-12-19 LAB
ALBUMIN SERPL-MCNC: 4.6 G/DL (ref 3.6–5.1)
ALBUMIN/GLOB SERPL: 1.9 {RATIO} (ref 1–2.5)
ALP SERPL-CCNC: 98 U/L (ref 33–130)
ALT 1742-6: 12 U/L (ref 0–32)
AST 1920-8: 7 U/L (ref 0–35)
BILIRUB SERPL-MCNC: 0.7 MG/DL (ref 0.2–1.2)
BUN SERPL-MCNC: 14 MG/DL (ref 7–25)
BUN/CREATININE RATIO: 17.1 (ref 6–22)
CALCIUM SERPL-MCNC: 9.3 MG/DL (ref 8.6–10.3)
CHLORIDE SERPLBLD-SCNC: 108 MMOL/L (ref 98–110)
CHOLEST SERPL-MCNC: 167 MG/DL (ref 0–199)
CHOLEST/HDLC SERPL: 3 {RATIO} (ref 0–5)
CO2 SERPL-SCNC: 27.3 MMOL/L (ref 20–32)
CREAT SERPL-MCNC: 0.82 MG/DL (ref 0.7–1.18)
GLOBULIN, CALCULATED - QUEST: 2.4 (ref 1.9–3.7)
GLUCOSE SERPL-MCNC: 107 MG/DL (ref 60–99)
HDLC SERPL-MCNC: 52 MG/DL (ref 40–150)
LDLC SERPL CALC-MCNC: 92 MG/DL (ref 0–130)
POTASSIUM SERPL-SCNC: 4.25 MMOL/L (ref 3.5–5.3)
PROT SERPL-MCNC: 7 G/DL (ref 6.1–8.1)
SODIUM SERPL-SCNC: 143.7 MMOL/L (ref 135–146)
TRIGL SERPL-MCNC: 114 MG/DL (ref 0–149)

## 2019-12-19 PROCEDURE — 80061 LIPID PANEL: CPT | Performed by: FAMILY MEDICINE

## 2019-12-19 PROCEDURE — 36415 COLL VENOUS BLD VENIPUNCTURE: CPT | Performed by: FAMILY MEDICINE

## 2019-12-19 PROCEDURE — 99214 OFFICE O/P EST MOD 30 MIN: CPT | Performed by: FAMILY MEDICINE

## 2019-12-19 PROCEDURE — 80053 COMPREHEN METABOLIC PANEL: CPT | Performed by: FAMILY MEDICINE

## 2019-12-19 RX ORDER — LISINOPRIL 20 MG/1
20 TABLET ORAL DAILY
Qty: 90 TABLET | Refills: 3 | Status: SHIPPED | OUTPATIENT
Start: 2019-12-19 | End: 2021-01-07

## 2019-12-19 ASSESSMENT — MIFFLIN-ST. JEOR: SCORE: 1827.41

## 2019-12-19 NOTE — NURSING NOTE
Michelle is here today for a fasting med recheck.    Pre-visit Screening:  Immunizations:  up to date- will call insurance on shingrix  Colonoscopy:  Will check with insurance  Mammogram: is up to date  Asthma Action Test/Plan:  IRINEO  PHQ9:  NA  GAD7:  IRINEO  Questioned patient about current smoking habits Pt. has never smoked.  Ok to leave detailed message on voice mail for today's visit only Yes, phone # [phone]

## 2019-12-19 NOTE — PROGRESS NOTES
"  SUBJECTIVE:  Michelle Grant is an 54 year old female who presents for evaluation of   Hypertension after starting medication 6 months ago. She indicates that she is feeling well and   denies any symptoms referable to her elevated blood pressure.   Specifically denies chest pain, palpitations, dyspnea, orthopnea,   PND or peripheral edema. Current medication regimen is as listed   below. Patient denies any side effects of medication.    Family history: positive for hypertension, cardiovascular disease and elevated cholesterol  Age at onset of elevated blood pressure: 54  Cardiovascular risk factors: family history, hypertension and obesity  Use of agents associated with hypertension: none  History of renal disease: negative  History of flank trauma: negative    Current Outpatient Medications   Medication     lisinopril (PRINIVIL/ZESTRIL) 20 MG tablet     No current facility-administered medications for this visit.      No Known Allergies    Social History     Tobacco Use     Smoking status: Never Smoker     Smokeless tobacco: Never Used   Substance Use Topics     Alcohol use: Yes     Alcohol/week: 0.8 standard drinks     Types: 1 drink(s) per week     Comment: rare       OBJECTIVE:  /82 (BP Location: Right arm, Patient Position: Sitting, Cuff Size: Adult Large)   Pulse 72   Temp 97.9  F (36.6  C) (Oral)   Ht 1.727 m (5' 8\")   Wt 117.4 kg (258 lb 12.8 oz)   SpO2 99%   BMI 39.35 kg/m    Repeat BP R arm seated = 130/82  with regular size cuff.  Fundi: deferred  Thyroid: normal to inspection and palpation  Lungs: negative, Percussion normal. Good diaphragmatic excursion. Lungs clear  Heart: negative, PMI normal. No lifts, heaves, or thrills. RRR. No murmurs, clicks gallops or rub  Peripheral pulses: radial=4/4, femoral=4/4, popliteal=4/4, dorsalis pedis=4/4,  Abd; The abdomen is soft without tenderness, guarding, mass or organomegaly. Bowel sounds are normal. No CVA tenderness or inguinal adenopathy " noted.  BMI : Body mass index is 39.35 kg/m .    ASSESSMENT:  (I10) Benign essential hypertension  (primary encounter diagnosis)  Plan: Comprehensive Metobolic Panel (BFP), VENOUS         COLLECTION, lisinopril (PRINIVIL/ZESTRIL) 20 MG        tablet        1)  Medication: continue current medication regimen unchanged  2)  Dietary sodium restriction  3)  Regular aerobic exercise  4)  Recheck in 1 year, sooner should new symptoms or   problems arise.    Patient Education: Reviewed risks of hypertension and principles of   treatment.        (Z79.549) Encounter for long-term (current) use of medications  Plan: Comprehensive Metobolic Panel (BFP), VENOUS         COLLECTION            (E66.01) Morbid obesity (H)  Comment: congratulations on weight loss  Plan: Lipid Panel (BFP), Comprehensive Metobolic         Panel (BFP), VENOUS COLLECTION        A low fat diet, regular aerobic exercise like walking 30 minutes daily and weight control is the treatment recommendations at this time      (Z13.220) Screening cholesterol level  Plan: Lipid Panel (BFP), Comprehensive Metobolic         Panel (BFP), VENOUS COLLECTION            (Z13.1) Screening for diabetes mellitus  Plan: Comprehensive Metobolic Panel (BFP), VENOUS         COLLECTION.

## 2019-12-19 NOTE — PATIENT INSTRUCTIONS
1)  Medication: continue current medication regimen unchanged  2)  Dietary sodium restriction  3)  Regular aerobic exercise and continued weight loss  4)  Recheck in 1 year, sooner should new symptoms or   problems arise.    Patient Education: Reviewed risks of hypertension and principles of   treatment.

## 2020-02-24 ENCOUNTER — OFFICE VISIT (OUTPATIENT)
Dept: FAMILY MEDICINE | Facility: CLINIC | Age: 55
End: 2020-02-24

## 2020-02-24 VITALS
OXYGEN SATURATION: 98 % | HEIGHT: 68 IN | BODY MASS INDEX: 39.1 KG/M2 | WEIGHT: 258 LBS | TEMPERATURE: 100.4 F | SYSTOLIC BLOOD PRESSURE: 128 MMHG | RESPIRATION RATE: 16 BRPM | HEART RATE: 107 BPM | DIASTOLIC BLOOD PRESSURE: 78 MMHG

## 2020-02-24 DIAGNOSIS — R05.9 COUGH: Primary | ICD-10-CM

## 2020-02-24 DIAGNOSIS — Z12.11 SPECIAL SCREENING FOR MALIGNANT NEOPLASMS, COLON: ICD-10-CM

## 2020-02-24 DIAGNOSIS — J06.9 UPPER RESPIRATORY TRACT INFECTION, UNSPECIFIED TYPE: ICD-10-CM

## 2020-02-24 LAB
FLUAV AG UPPER RESP QL IA.RAPID: NORMAL
FLUBV AG UPPER RESP QL IA.RAPID: NORMAL

## 2020-02-24 PROCEDURE — 87804 INFLUENZA ASSAY W/OPTIC: CPT | Performed by: FAMILY MEDICINE

## 2020-02-24 PROCEDURE — 99213 OFFICE O/P EST LOW 20 MIN: CPT | Performed by: FAMILY MEDICINE

## 2020-02-24 RX ORDER — GUAIFENESIN 600 MG/1
1200 TABLET, EXTENDED RELEASE ORAL 2 TIMES DAILY
Qty: 20 TABLET | Refills: 0 | Status: SHIPPED | OUTPATIENT
Start: 2020-02-24 | End: 2020-10-13

## 2020-02-24 RX ORDER — BENZONATATE 100 MG/1
100 CAPSULE ORAL 3 TIMES DAILY PRN
Qty: 21 CAPSULE | Refills: 0 | Status: SHIPPED | OUTPATIENT
Start: 2020-02-24 | End: 2020-10-13

## 2020-02-24 RX ORDER — MULTIPLE VITAMINS W/ MINERALS TAB 9MG-400MCG
1 TAB ORAL DAILY
COMMUNITY
End: 2022-11-10 | Stop reason: ALTCHOICE

## 2020-02-24 ASSESSMENT — MIFFLIN-ST. JEOR: SCORE: 1818.78

## 2020-02-24 NOTE — PATIENT INSTRUCTIONS
Cough  (primary encounter diagnosis)  Plan: Influenza A and B (BFP), benzonatate (TESSALON)        100 MG capsule, guaiFENesin (MUCINEX) 600 MG 12        hr tablet        Symptomatic care with decongestants, fluids, tylenol/advil prn. Use GUAIFENESIN  MG OR TBCR, 1 tab po BID (Twice per day), D: 20, R: 0 for congestion and cough.    In addition, I have suggested that the patient   monitor for symptoms of bacterial infection expecting slow gradual resolution of viral URI as the natural course.

## 2020-02-24 NOTE — PROGRESS NOTES
SUBJECTIVE: 55 year old female complaining of cough followed by nasal congestion and fatigue for 3 day(s).   The patient describes some noise when she coughs and breaths concern her/ no history of asthma.   The patient denies a history of fever, Sob or Gi symptoms.   Smoking history: No.   Relevant past medical history: positive for obesity and hypertension.    OBJECTIVE: The patient appears healthy, alert, no distress, cooperative, over weight and fatigued.   EARS: negative  NOSE/SINUS: positive findings: mucosa erythematous and swollen, clear rhinorrhea   THROAT: normal, tonsil with hypertrophy, no exudates present and post nasal drainage   NECK:Neck supple. No adenopathy. Thyroid symmetric, normal size,, Carotids without bruits.   CHEST: Regular rate and  rhythm. S1 and S2 normal, no murmurs, clicks, gallops or rubs. No edema or JVD. Chest is clear; no wheezes or rales.  Dry cough    Neg flu    ASSESSMENT: (R05) Cough  (primary encounter diagnosis)  Plan: Influenza A and B (BFP), benzonatate (TESSALON)        100 MG capsule, guaiFENesin (MUCINEX) 600 MG 12        hr tablet        Symptomatic care with decongestants, fluids, tylenol/advil prn. Use GUAIFENESIN  MG OR TBCR, 1 tab po BID (Twice per day), D: 20, R: 0 for congestion and cough.    In addition, I have suggested that the patient   monitor for symptoms of bacterial infection expecting slow gradual resolution of viral URI as the natural course.      (J06.9) Upper respiratory tract infection, unspecified type  Plan: guaiFENesin (MUCINEX) 600 MG 12 hr tablet        As above    (Z12.11) Special screening for malignant neoplasms, colon  Plan: GASTROENTEROLOGY ADULT REF PROCEDURE ONLY         Other; [facility] [phone]        encouraged

## 2020-02-24 NOTE — NURSING NOTE
Michelle is here for cough for 4 days, has tried Mucinex but didn't help much, feels wheezing noise when she breathes out.          Pre-visit Screening:  Immunizations:  up to date  Colonoscopy:  is due and ordered today  Mammogram: is due and to be scheduled by patient for later completion  Asthma Action Test/Plan:  NA  PHQ9:  None  GAD7:  None  Questioned patient about current smoking habits Pt. has never smoked.  Ok to leave detailed message on voice mail for today's visit only Yes, phone # cell

## 2020-10-13 ENCOUNTER — OFFICE VISIT (OUTPATIENT)
Dept: FAMILY MEDICINE | Facility: CLINIC | Age: 55
End: 2020-10-13

## 2020-10-13 VITALS
TEMPERATURE: 97.8 F | HEART RATE: 69 BPM | RESPIRATION RATE: 18 BRPM | OXYGEN SATURATION: 98 % | SYSTOLIC BLOOD PRESSURE: 138 MMHG | DIASTOLIC BLOOD PRESSURE: 88 MMHG | BODY MASS INDEX: 39.55 KG/M2 | HEIGHT: 69 IN | WEIGHT: 267 LBS

## 2020-10-13 DIAGNOSIS — I10 BENIGN ESSENTIAL HYPERTENSION: Primary | ICD-10-CM

## 2020-10-13 PROCEDURE — 99213 OFFICE O/P EST LOW 20 MIN: CPT | Performed by: FAMILY MEDICINE

## 2020-10-13 ASSESSMENT — MIFFLIN-ST. JEOR: SCORE: 1867.54

## 2020-10-13 NOTE — PROGRESS NOTES
SUBJECTIVE: 55 year old female complaining of elevated home BP readings for 2 week(s).   The patient describes worried lately and having hat band headaches.   The patient denies a history of chest pain, neurological changes or URI symptoms.   Smoking history: No.   Relevant past medical history: positive for obesity.    OBJECTIVE: The patient appears healthy, alert, no distress, cooperative, smiling and over weight.   EARS: negative  NOSE/SINUS: Nares normal. Septum midline. Mucosa normal. No drainage or sinus tenderness.   THROAT: normal   NECK:Neck supple. No adenopathy. Thyroid symmetric, normal size,, Carotids without bruits.   CHEST: Regular rate and  rhythm. S1 and S2 normal, no murmurs, clicks, gallops or rubs. No edema or JVD. Chest is clear; no wheezes or rales.    Repeated BP here    ASSESSMENT: (I10) Benign essential hypertension  (primary encounter diagnosis)  Comment: check your monitor  Plan: 1)  Medication: continue current medication regimen unchanged  2)  Dietary sodium restriction  3)  Regular aerobic exercise  4)  Recheck in 1 month, sooner should new symptoms or   problems arise.    Patient Education: Reviewed risks of hypertension and principles of   treatment.

## 2020-10-13 NOTE — PATIENT INSTRUCTIONS
Benign essential hypertension  (primary encounter diagnosis)  Comment: check your monitor  Plan: 1)  Medication: continue current medication regimen unchanged  2)  Dietary sodium restriction  3)  Regular aerobic exercise  4)  Recheck in 1 month, sooner should new symptoms or   problems arise.    Patient Education: Reviewed risks of hypertension and principles of   treatment.

## 2020-10-13 NOTE — NURSING NOTE
Michelle is here for a bp check.        Pre-visit Screening:  Immunizations:  up to date  Colonoscopy:  is due and to be scheduled by patient for later completion  Mammogram: is due and to be scheduled by patient for later completion  Asthma Action Test/Plan:  NA  PHQ9:  None  GAD7:  None  Questioned patient about current smoking habits Pt. has never smoked.  Ok to leave detailed message on voice mail for today's visit only Yes, phone # [phone]

## 2020-11-29 ENCOUNTER — E-VISIT (OUTPATIENT)
Dept: FAMILY MEDICINE | Facility: CLINIC | Age: 55
End: 2020-11-29

## 2020-11-29 DIAGNOSIS — Z20.822 SUSPECTED COVID-19 VIRUS INFECTION: ICD-10-CM

## 2020-11-29 DIAGNOSIS — R05.9 COUGH: ICD-10-CM

## 2020-11-29 DIAGNOSIS — U07.1 INFECTION DUE TO 2019 NOVEL CORONAVIRUS: Primary | ICD-10-CM

## 2020-11-29 PROCEDURE — 99421 OL DIG E/M SVC 5-10 MIN: CPT | Performed by: FAMILY MEDICINE

## 2020-11-30 RX ORDER — BENZONATATE 100 MG/1
100 CAPSULE ORAL 3 TIMES DAILY PRN
Qty: 21 CAPSULE | Refills: 0 | Status: SHIPPED | OUTPATIENT
Start: 2020-11-30 | End: 2020-12-14

## 2020-11-30 NOTE — PATIENT INSTRUCTIONS
Symptomatic care with decongestants, fluids, tylenol/advil prn. Use GUAIFENESIN  MG OR TBCR, 1 tab po BID (Twice per day), D: 20, R: 0 for congestion and cough.    In addition, I have suggested that the patient   monitor for symptoms of bacterial infection expecting slow gradual resolution of viral URI as the natural course.

## 2020-12-02 ENCOUNTER — TELEPHONE (OUTPATIENT)
Dept: FAMILY MEDICINE | Facility: CLINIC | Age: 55
End: 2020-12-02

## 2020-12-02 DIAGNOSIS — R05.9 COUGH: Primary | ICD-10-CM

## 2020-12-02 NOTE — TELEPHONE ENCOUNTER
Michelle Grant called the clinic support line with the following:    States that the benzonatate did not help much. She is wondering if there is anything else she can take for her cough. Still having fatigue as well.    Please advise.    Thanks,Misti    [phone] (home)

## 2020-12-13 ENCOUNTER — MYC MEDICAL ADVICE (OUTPATIENT)
Dept: FAMILY MEDICINE | Facility: CLINIC | Age: 55
End: 2020-12-13

## 2020-12-13 DIAGNOSIS — R05.9 COUGH: ICD-10-CM

## 2020-12-13 DIAGNOSIS — U07.1 INFECTION DUE TO 2019 NOVEL CORONAVIRUS: ICD-10-CM

## 2020-12-14 RX ORDER — BENZONATATE 100 MG/1
100 CAPSULE ORAL 3 TIMES DAILY PRN
Qty: 21 CAPSULE | Refills: 0 | Status: SHIPPED | OUTPATIENT
Start: 2020-12-14 | End: 2020-12-31

## 2020-12-31 ENCOUNTER — OFFICE VISIT (OUTPATIENT)
Dept: FAMILY MEDICINE | Facility: CLINIC | Age: 55
End: 2020-12-31

## 2020-12-31 VITALS
TEMPERATURE: 98.4 F | HEART RATE: 88 BPM | OXYGEN SATURATION: 99 % | BODY MASS INDEX: 38.96 KG/M2 | WEIGHT: 260 LBS | SYSTOLIC BLOOD PRESSURE: 130 MMHG | DIASTOLIC BLOOD PRESSURE: 82 MMHG

## 2020-12-31 DIAGNOSIS — R05.3 PERSISTENT COUGH FOR 3 WEEKS OR LONGER: Primary | ICD-10-CM

## 2020-12-31 PROCEDURE — 99213 OFFICE O/P EST LOW 20 MIN: CPT | Performed by: PHYSICIAN ASSISTANT

## 2020-12-31 RX ORDER — BENZONATATE 100 MG/1
100 CAPSULE ORAL 3 TIMES DAILY PRN
Qty: 30 CAPSULE | Refills: 0 | Status: SHIPPED | OUTPATIENT
Start: 2020-12-31 | End: 2022-01-03

## 2020-12-31 RX ORDER — PREDNISONE 20 MG/1
20 TABLET ORAL 2 TIMES DAILY
Qty: 10 TABLET | Refills: 0 | Status: SHIPPED | OUTPATIENT
Start: 2020-12-31 | End: 2021-01-05

## 2020-12-31 NOTE — PROGRESS NOTES
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 Also developed muscular chest pain, fatigue. Has been working with PCP, Dr. Chen, who has prescribed 2 rounds of Tessalon Perles, which were somewhat helpful. Cough is relatively dry and irritable. Cough is mainly during the days. Sleeping okay. No fever, sweats, chills. Running a humidifier at work.     No history of asthma, pneumonia.      PMH, MEDICATIONS, ALLERGIES, SOCIAL AND FAMILY HISTORY in [facility] and reviewed by me personally.    ROS negative other than the symptoms noted above in the HPI.    Examination   /82 (BP Location: Left arm, Patient Position: Sitting, Cuff Size: Adult Large)   Pulse 88   Temp 98.4  F (36.9  C) (Oral)   Wt 117.9 kg (260 lb)   SpO2 99%   BMI 38.96 kg/m       Constitutional: Sitting comfortably, in no acute distress. Vital signs noted  Eyes: pupils equal round reactive to light and accomodation, extra ocular movements intact  Ears: external canals and TMs free of abnormalities  Nose: patent, without mucosal abnormalities  Mouth and throat: without erythema or lesions of the mucosa  Neck:  no adenopathy, trachea midline and normal to palpation, thyroid normal to palpation  Cardiovascular:  regular rate and rhythm, no murmurs, clicks, or gallops  Respiratory:  normal respiratory rate and rhythm, lungs clear to auscultation  SKIN: No jaundice/pallor/rash.   Psychiatric: mentation appears normal and affect normal/bright        A/P    ICD-10-CM    1. Persistent cough for 3 weeks or longer  R05 benzonatate (TESSALON) 100 MG capsule     predniSONE (DELTASONE) 20 MG tablet       DISCUSSION:  Reassured by clear sounding lungs. Agreed to hold off on CXR. Tessalon Perles as needed for cough suppresion. Cautioned that this medication can cause drowsiness, so patient should first use this medication at a time where they  will not need to do any driving or anything else that is dangerous while drowsy until they knows how their body will react. Also prescribed 5 day course of prednisone 20 mg to take twice daily with food. Warned of side effects like drowsiness, jitteriness, nausea, diarrhea, constipation, weight changes, irritability, mood swings, and to contact me if noted.    Contact me in 1 week if not significantly better, or sooner with worsening.     follow up visit: As needed    Gladys Wilburn PA-C  [facility]

## 2020-12-31 NOTE — NURSING NOTE
Michelle is here for a cough that started when she got covid. It has not gone away.    Pre-Visit Screening:  Immunizations:UTD  Colonoscopy:NA  Mammogram:Na  Asthma Action Test/Plan:NA  PHQ9:NA  GAD7:NA  Questioned patient about current smoking habits Pt.never smoked  OK to leave a detailed message on voice mail for today's visit yes, phone # [phone]

## 2021-01-07 ENCOUNTER — OFFICE VISIT (OUTPATIENT)
Dept: FAMILY MEDICINE | Facility: CLINIC | Age: 56
End: 2021-01-07

## 2021-01-07 VITALS
SYSTOLIC BLOOD PRESSURE: 138 MMHG | DIASTOLIC BLOOD PRESSURE: 78 MMHG | RESPIRATION RATE: 20 BRPM | HEART RATE: 80 BPM | TEMPERATURE: 98.2 F | BODY MASS INDEX: 38.66 KG/M2 | OXYGEN SATURATION: 97 % | WEIGHT: 261 LBS | HEIGHT: 69 IN

## 2021-01-07 DIAGNOSIS — Z12.31 ENCOUNTER FOR SCREENING MAMMOGRAM FOR BREAST CANCER: ICD-10-CM

## 2021-01-07 DIAGNOSIS — Z79.899 ENCOUNTER FOR LONG-TERM (CURRENT) USE OF MEDICATIONS: ICD-10-CM

## 2021-01-07 DIAGNOSIS — Z12.11 SPECIAL SCREENING FOR MALIGNANT NEOPLASMS, COLON: ICD-10-CM

## 2021-01-07 DIAGNOSIS — I10 BENIGN ESSENTIAL HYPERTENSION: Primary | ICD-10-CM

## 2021-01-07 LAB
ALBUMIN SERPL-MCNC: 4.2 G/DL (ref 3.6–5.1)
ALBUMIN/GLOB SERPL: 1.6 {RATIO} (ref 1–2.5)
ALP SERPL-CCNC: 76 U/L (ref 33–130)
ALT 1742-6: 19 U/L (ref 0–32)
AST 1920-8: 11 U/L (ref 0–35)
BILIRUB SERPL-MCNC: 0.4 MG/DL (ref 0.2–1.2)
BUN SERPL-MCNC: 15 MG/DL (ref 7–25)
BUN/CREATININE RATIO: 16.5 (ref 6–22)
CALCIUM SERPL-MCNC: 9.4 MG/DL (ref 8.6–10.3)
CHLORIDE SERPLBLD-SCNC: 106.5 MMOL/L (ref 98–110)
CO2 SERPL-SCNC: 29.8 MMOL/L (ref 20–32)
CREAT SERPL-MCNC: 0.91 MG/DL (ref 0.7–1.18)
GLOBULIN, CALCULATED - QUEST: 2.6 (ref 1.9–3.7)
GLUCOSE SERPL-MCNC: 103 MG/DL (ref 60–99)
HEMOGLOBIN: 12.4 G/DL (ref 11.7–15.7)
POTASSIUM SERPL-SCNC: 4.75 MMOL/L (ref 3.5–5.3)
PROT SERPL-MCNC: 6.8 G/DL (ref 6.1–8.1)
SODIUM SERPL-SCNC: 141.6 MMOL/L (ref 135–146)

## 2021-01-07 PROCEDURE — 80053 COMPREHEN METABOLIC PANEL: CPT | Performed by: FAMILY MEDICINE

## 2021-01-07 PROCEDURE — 99213 OFFICE O/P EST LOW 20 MIN: CPT | Performed by: FAMILY MEDICINE

## 2021-01-07 PROCEDURE — 85018 HEMOGLOBIN: CPT | Performed by: FAMILY MEDICINE

## 2021-01-07 PROCEDURE — 36415 COLL VENOUS BLD VENIPUNCTURE: CPT | Performed by: FAMILY MEDICINE

## 2021-01-07 RX ORDER — LISINOPRIL 20 MG/1
20 TABLET ORAL DAILY
Qty: 90 TABLET | Refills: 3 | Status: SHIPPED | OUTPATIENT
Start: 2021-01-07 | End: 2022-01-03

## 2021-01-07 ASSESSMENT — MIFFLIN-ST. JEOR: SCORE: 1840.33

## 2021-01-07 NOTE — PATIENT INSTRUCTIONS
await labs  Plan: Comprehensive Metobolic Panel (BFP), VENOUS         COLLECTION, HEMOGLOBIN (BFP), lisinopril         (ZESTRIL) 20 MG tablet        1)  Medication: continue current medication regimen unchanged  2)  Dietary sodium restriction  3)  Regular aerobic exercise  4)  Recheck in 1 year, sooner should new symptoms or   problems arise.    Patient Education: Reviewed risks of hypertension and principles of   treatment.

## 2021-01-07 NOTE — PROGRESS NOTES
"  SUBJECTIVE:  Michelle Grant is an 55 year old female who presents for evaluation of   hypertension. Started medications in 2019.  She indicates that she is feeling well and   denies any symptoms referable to her elevated blood pressure.   Specifically denies chest pain, palpitations, dyspnea, orthopnea,   PND or peripheral edema. Current medication regimen is as listed   below. Patient denies any side effects of medication.    Family history: positive for hypertension, cardiovascular disease and elevated cholesterol  Age at onset of elevated blood pressure: 54  Cardiovascular risk factors: family history, hypertension and obesity  Use of agents associated with hypertension: none  History of renal disease: negative  History of flank trauma: negative    Current Outpatient Medications   Medication     benzonatate (TESSALON) 100 MG capsule     lisinopril (PRINIVIL/ZESTRIL) 20 MG tablet     multivitamin w/minerals (MULTI-VITAMIN) tablet     No current facility-administered medications for this visit.      No Known Allergies    Social History     Tobacco Use     Smoking status: Never Smoker     Smokeless tobacco: Never Used   Substance Use Topics     Alcohol use: Yes     Alcohol/week: 0.8 standard drinks     Types: 1 drink(s) per week     Comment: rare       OBJECTIVE:    Monitored with repeated BP for 10 minutes    /78 (BP Location: Right arm, Patient Position: Sitting, Cuff Size: Adult Large)   Pulse 80   Temp 98.2  F (36.8  C) (Oral)   Resp 20   Ht 1.74 m (5' 8.5\")   Wt 118.4 kg (261 lb)   SpO2 97%   BMI 39.11 kg/m    Repeat BP R arm seated = 138/78 with large size cuff.  Fundi: deferred  Thyroid: normal to inspection and palpation  Lungs: negative, Percussion normal. Good diaphragmatic excursion. Lungs clear  Heart: negative, PMI normal. No lifts, heaves, or thrills. RRR. No murmurs, clicks gallops or rub  Peripheral pulses: radial=4/4, femoral=4/4, popliteal=4/4, dorsalis pedis=4/4,  Abd; The abdomen " is soft without tenderness, guarding, mass or organomegaly. Bowel sounds are normal. No CVA tenderness or inguinal adenopathy noted. Obese  Ext: The lower extremities are normal and reveal no sign of DVT. Calves and thighs are soft and non tender, color is normal, no swelling or redness. Srinivas's sign is negative.  Pedal pulses are normal.  BMI : Body mass index is 39.11 kg/m .    ASSESSMENT:  (I10) Benign essential hypertension  (primary encounter diagnosis)  Comment: await labs  Plan: Comprehensive Metobolic Panel (BFP), VENOUS         COLLECTION, HEMOGLOBIN (BFP), lisinopril         (ZESTRIL) 20 MG tablet        1)  Medication: continue current medication regimen unchanged  2)  Dietary sodium restriction  3)  Regular aerobic exercise  4)  Recheck in 1 year, sooner should new symptoms or   problems arise.    Patient Education: Reviewed risks of hypertension and principles of   treatment.        (Z79.899) Encounter for long-term (current) use of medications  Plan: Comprehensive Metobolic Panel (BFP), VENOUS         COLLECTION, HEMOGLOBIN (BFP)            (Z12.11) Special screening for malignant neoplasms, colon  Plan: GASTROENTEROLOGY ADULT REF PROCEDURE ONLY        encouraged    (Z12.31) Encounter for screening mammogram for breast cancer    Plan: Mammo Screening digital (bilat)            Patient Education: Reviewed risks of hypertension and principles of   treatment.

## 2021-01-07 NOTE — NURSING NOTE
Michelle is here today for a BP med recheck.    Pre-visit Screening:  Immunizations:  up to date  Colonoscopy:  is due and ordered today  Mammogram: is due and ordered today  Asthma Action Test/Plan:  NA  PHQ9:  NA  GAD7:  NA  Questioned patient about current smoking habits Pt. has never smoked.  Ok to leave detailed message on voice mail for today's visit only Yes, phone # [phone]

## 2021-01-09 ENCOUNTER — HEALTH MAINTENANCE LETTER (OUTPATIENT)
Age: 56
End: 2021-01-09

## 2021-10-23 ENCOUNTER — HEALTH MAINTENANCE LETTER (OUTPATIENT)
Age: 56
End: 2021-10-23

## 2021-11-14 ENCOUNTER — OFFICE VISIT (OUTPATIENT)
Dept: URGENT CARE | Facility: URGENT CARE | Age: 56
End: 2021-11-14
Payer: COMMERCIAL

## 2021-11-14 VITALS
TEMPERATURE: 98.1 F | SYSTOLIC BLOOD PRESSURE: 139 MMHG | WEIGHT: 261 LBS | HEART RATE: 92 BPM | RESPIRATION RATE: 20 BRPM | DIASTOLIC BLOOD PRESSURE: 84 MMHG | BODY MASS INDEX: 39.11 KG/M2 | OXYGEN SATURATION: 96 %

## 2021-11-14 DIAGNOSIS — S61.211A LACERATION OF LEFT INDEX FINGER WITHOUT FOREIGN BODY WITHOUT DAMAGE TO NAIL, INITIAL ENCOUNTER: Primary | ICD-10-CM

## 2021-11-14 PROCEDURE — 99213 OFFICE O/P EST LOW 20 MIN: CPT | Mod: 25 | Performed by: INTERNAL MEDICINE

## 2021-11-14 PROCEDURE — 12001 RPR S/N/AX/GEN/TRNK 2.5CM/<: CPT | Performed by: INTERNAL MEDICINE

## 2021-11-15 NOTE — PROGRESS NOTES
SUBJECTIVE:   56 year old female sustained laceration of left index finger 1 hours ago. Nature of injury: she was cutting some fabric with a  when she accidentally rolled the blade over the left index finger.. Tetanus vaccination status reviewed: tetanus re-vaccination not indicated.     OBJECTIVE:   Patient appears well, vitals are normal. Laceration 1 cm noted. Oriented in a transverse plane across the left lateral index finger, distal phalanx, from the edge of the nail to the pad of the finger passing through periungual tissues but sparing the nail;  Description: clean wound edges, no foreign bodies. Neurovascular and tendon structures are intact.    ASSESSMENT:   Laceration as described.    PLAN:   Anesthesia with 1% Lidocaine without Epinephrine. Wound cleansed, debrided of visible foreign material and necrotic tissue, and sutured. Antibiotic ointment and dressing applied.  Wound care instructions provided.  Observe for any signs of infection or other problems.  Return for suture removal in 10 days.     Russell Corcoran MD

## 2021-11-15 NOTE — PATIENT INSTRUCTIONS
Patient Education     Laceration of an Arm or Leg: Stitches, Staples, or Tape   A laceration is a cut through the skin. If it's deep or it's gaping open, it may require stitches or staples to close so it can heal. Minor cuts may be treated with surgical tape closures, or skin glue.   X-rays may be done if something may have entered the skin through the cut. You may also need a tetanus shot if you are not up to date on this vaccine.   Home care    Follow the healthcare provider s instructions on how to care for the cut.    Wash your hands with soap and clean, running water before and after caring for your wound. This is to help prevent infection.    Keep the wound clean and dry. If a bandage was applied and it becomes wet or dirty, replace it. Otherwise, leave it in place for the first 24 hours, then change it once a day or as directed.    If stitches or staples were used, clean the wound daily:  ? After removing the bandage, wash the area with soap and water. Use a wet cotton swab to loosen and remove any blood or crust that forms.  ? After cleaning, keep the wound clean and dry. Talk with your healthcare provider before putting any antibiotic ointment on the wound. Reapply the bandage.    Remove the bandage to shower as usual after the first 24 hours, but don't soak the area in water (no swimming) until the stitches or staples are removed.    If surgical tape closures were used, keep the area clean and dry. If it becomes wet, blot it dry with a towel. Let the surgical tape fall off on its own.    Follow the healthcare provider's instructions for any medicines prescribed.  ? The provider may prescribe an antibiotic cream or ointment to prevent infection. He or she may also prescribe an antibiotic pill. Don't stop taking this medicine until you have finished it all or the provider tells you to stop.  ? The provider may also prescribe medicine for pain. Follow the instructions exactly for how to take these  medicines.    Don't do activities that may reopen your wound.    Follow-up care  Follow up with your healthcare provider, or as advised. Most skin wounds heal within 10 days. But an infection may sometimes occur even with proper treatment. Check the wound daily for the signs of infection listed below. Stitches and staples should be removed within 7 to14 days. If surgical tape closures were used, you may remove them after 10 days if they have not fallen off by then.    When to seek medical advice  Call your healthcare provider right away if any of these occur:    Wound bleeding not controlled by direct pressure    Signs of infection, including increasing pain in the wound, increasing wound redness or swelling, or pus or bad odor coming from the wound    Chills, fever of 100.4 F (38 C) or higher, or as directed by your healthcare provider    Stitches or staples coming apart or falling out or surgical tape falling off before 7 days    Wound edges reopening    Color changes in the wound    Numbness around the wound     Decreased movement around the injured area  [employer] last reviewed this educational content on 6/1/2020 2000-2021 [employer]. All rights reserved. This information is not intended as a substitute for professional medical care. Always follow your healthcare professional's instructions.

## 2021-11-24 ENCOUNTER — OFFICE VISIT (OUTPATIENT)
Dept: URGENT CARE | Facility: URGENT CARE | Age: 56
End: 2021-11-24
Payer: COMMERCIAL

## 2021-11-24 VITALS — TEMPERATURE: 98.1 F

## 2021-11-24 DIAGNOSIS — Z48.02 VISIT FOR SUTURE REMOVAL: Primary | ICD-10-CM

## 2021-11-24 PROCEDURE — 99207 PR NO BILLABLE SERVICE THIS VISIT: CPT | Performed by: PREVENTIVE MEDICINE

## 2021-11-25 NOTE — PROGRESS NOTES
Assessment & Plan     (Z48.02) Visit for suture removal  (primary encounter diagnosis)  Sutures (3) removed without problem.             No follow-ups on file.    Duane Aquino MD  [facility]    Subjective     Michelle Grant is a 56 year old year old female who presents to clinic today for the following health issues:    Patient presents with:  Urgent Care: suture removal     This is a 55 yo female who had sutures placed on her left distal index finger 10 days ago.  Here for suture removal.  Finger feels normal.      Patient Active Problem List   Diagnosis     CARDIOVASCULAR SCREENING; LDL GOAL LESS THAN 160     Health Care Home     ACP (advance care planning)     Morbid obesity (H)     Benign essential hypertension       Current Outpatient Medications   Medication     benzonatate (TESSALON) 100 MG capsule     lisinopril (ZESTRIL) 20 MG tablet     multivitamin w/minerals (MULTI-VITAMIN) tablet     No current facility-administered medications for this visit.       Past Medical History:   Diagnosis Date     Benign essential hypertension 6/4/2019     Morbid obesity (H) 7/30/2018       Social History   reports that she has never smoked. She has never used smokeless tobacco. She reports current alcohol use of about 0.8 standard drinks of alcohol per week. She reports that she does not use drugs.    Family History   Problem Relation Age of Onset     Gastrointestinal Disease Mother      Hypertension Mother      Lipids Mother      C.A.D. Father      Hypertension Father      Lipids Father      Neurologic Disorder Father         memory issues unknown etiology     Cancer - colorectal Other        Review of Systems  Constitutional, HEENT, cardiovascular, pulmonary, GI, , musculoskeletal, neuro, skin, endocrine and psych systems are negative, except as otherwise noted.      Objective    Temp 98.1  F (36.7  C)   Physical Exam   GENERAL: healthy, alert and no distress  EYES: Eyes grossly  normal to inspection, PERRL and conjunctivae and sclerae normal  HENT: ear canals and TM's normal, nose and mouth without ulcers or lesions  NECK: no adenopathy, no asymmetry, masses, or scars and thyroid normal to palpation  RESP: lungs clear to auscultation - no rales, rhonchi or wheezes  BREAST: normal without masses, tenderness or nipple discharge and no palpable axillary masses or adenopathy  CV: regular rate and rhythm, normal S1 S2, no S3 or S4, no murmur, click or rub, no peripheral edema and peripheral pulses strong  ABDOMEN: soft, nontender, no hepatosplenomegaly, no masses and bowel sounds normal  MS: no gross musculoskeletal defects noted, no edema  SKIN: no suspicious lesions or rashes  NEURO: Normal strength and tone, mentation intact and speech normal  PSYCH: mentation appears normal, affect normal/bright  Left index finger - nl active motion, sensation, cap refill.  No bruising, swelling, warmth, redness, tenderness.  Laceration well healed.  3 sutures in place.

## 2021-12-27 DIAGNOSIS — I10 BENIGN ESSENTIAL HYPERTENSION: ICD-10-CM

## 2021-12-27 RX ORDER — LISINOPRIL 20 MG/1
TABLET ORAL
Qty: 90 TABLET | Refills: 0 | COMMUNITY
Start: 2021-12-27

## 2021-12-27 NOTE — TELEPHONE ENCOUNTER
Michelle Grant is requesting a refill of:    Refused Prescriptions:                       Disp   Refills    lisinopril (ZESTRIL) 20 MG tablet [Pharmac*90 tab*0        Sig: TAKE 1 TABLET BY MOUTH ONCE DAILY  Refused By: RAMILA MAHER  Reason for Refusal: Patient needs appointment    Pt last seen 01/07/21 due for yearly OV

## 2022-01-03 ENCOUNTER — OFFICE VISIT (OUTPATIENT)
Dept: FAMILY MEDICINE | Facility: CLINIC | Age: 57
End: 2022-01-03

## 2022-01-03 VITALS
BODY MASS INDEX: 40.94 KG/M2 | HEIGHT: 69 IN | TEMPERATURE: 98.9 F | OXYGEN SATURATION: 98 % | DIASTOLIC BLOOD PRESSURE: 82 MMHG | SYSTOLIC BLOOD PRESSURE: 132 MMHG | HEART RATE: 79 BPM | WEIGHT: 276.4 LBS

## 2022-01-03 DIAGNOSIS — N64.52 NIPPLE DISCHARGE: ICD-10-CM

## 2022-01-03 DIAGNOSIS — I10 BENIGN ESSENTIAL HYPERTENSION: Primary | ICD-10-CM

## 2022-01-03 PROCEDURE — 99214 OFFICE O/P EST MOD 30 MIN: CPT | Performed by: FAMILY MEDICINE

## 2022-01-03 RX ORDER — LISINOPRIL 20 MG/1
20 TABLET ORAL DAILY
Qty: 90 TABLET | Refills: 3 | Status: SHIPPED | OUTPATIENT
Start: 2022-01-03 | End: 2023-07-10

## 2022-01-03 ASSESSMENT — MIFFLIN-ST. JEOR: SCORE: 1905.18

## 2022-01-03 NOTE — NURSING NOTE
Chief Complaint   Patient presents with     Recheck Medication     non-fasting today, refill medications     Breast Discharge     left breast discharge, mainly clear can be bloody     Pre-visit Screening:  Immunizations:  up to date  Colonoscopy:  is up to date  Mammogram: is up to date  Asthma Action Test/Plan:  NA  PHQ9:  NA  GAD7:  NA  Questioned patient about current smoking habits Pt. has never smoked.  Ok to leave detailed message on voice mail for today's visit only Yes, phone # [phone]

## 2022-01-03 NOTE — PROGRESS NOTES
"Assessment & Plan   Problem List Items Addressed This Visit        Circulatory    Benign essential hypertension - Primary    Relevant Medications    lisinopril (ZESTRIL) 20 MG tablet    Other Relevant Orders    Comprehensive Metobolic Panel (BFP)    Lipid Panel (BFP)    VENOUS COLLECTION      Other Visit Diagnoses     Nipple discharge        Relevant Orders    PROLACTIN ([facility])    Mammo diagnostic  digital (bilateral)*    US Breast Bilateral Complete 4 Quadrants    Radiology Referral         1. Benign essential hypertension  Controlled, check labs. She will followup for fasting labs. Refilled medications.  - lisinopril (ZESTRIL) 20 MG tablet; Take 1 tablet (20 mg) by mouth daily  Dispense: 90 tablet; Refill: 3  - Comprehensive Metobolic Panel (BFP); Future  - Lipid Panel (BFP); Future  - VENOUS COLLECTION; Future    2. Nipple discharge  Check prolactin level, diagnostic mammogram and ultrasound. Likely also referral to breast surgeon pending results.  - PROLACTIN ([facility]); Future  - Mammo diagnostic  digital (bilateral)*; Future  - US Breast Bilateral Complete 4 Quadrants; Future  - Radiology Referral; Future           BMI:   Estimated body mass index is 41.42 kg/m  as calculated from the following:    Height as of this encounter: 1.74 m (5' 8.5\").    Weight as of this encounter: 125.4 kg (276 lb 6.4 oz).         FUTURE APPOINTMENTS:       - Follow-up visit per results.    No follow-ups on file.    Unique Jensen MD  [facility]    Subjective     Nursing Notes:   Faina Posadas CMA  1/3/2022 12:01 PM  Addendum  Chief Complaint   Patient presents with     Recheck Medication     non-fasting today, refill medications     Breast Discharge     left breast discharge, mainly clear can be bloody     Pre-visit Screening:  Immunizations:  up to date  Colonoscopy:  is up to date  Mammogram: is up to date  Asthma Action Test/Plan:  NA  PHQ9:  NA  GAD7:  NA  Questioned patient about current smoking habits " "Pt. has never smoked.  Ok to leave detailed message on voice mail for today's visit only Yes, phone # [phone]           Michelle Grant is a 56 year old female who presents to clinic today for the following health issues   HPI     Here for blood pressure, is doing ok on the medications. Only has a few left. Blood pressures at home--running ok. Would like a refill. Due for labs.    Breast problem. Has a little clear fluid and occ some blood from nipple, left. No lumps or pain. This has been for 6 months. Not worsening.         Review of Systems   Constitutional, HEENT, cardiovascular, pulmonary, gi and gu systems are negative, except as otherwise noted.      Objective    /82 (BP Location: Left arm, Patient Position: Sitting, Cuff Size: Adult Large)   Pulse 79   Temp 98.9  F (37.2  C) (Temporal)   Ht 1.74 m (5' 8.5\")   Wt 125.4 kg (276 lb 6.4 oz)   SpO2 98%   BMI 41.42 kg/m    Body mass index is 41.42 kg/m .  Physical Exam   GENERAL: healthy, alert and no distress  EYES: Eyes grossly normal to inspection, PERRL and conjunctivae and sclerae normal  RESP: lungs clear to auscultation - no rales, rhonchi or wheezes  BREAST: normal without masses, tenderness or nipple discharge and no palpable axillary masses or adenopathy  CV: regular rate and rhythm, normal S1 S2, no S3 or S4, no murmur, click or rub, no peripheral edema and peripheral pulses strong  ABDOMEN: soft, nontender, no hepatosplenomegaly, no masses and bowel sounds normal  MS: no gross musculoskeletal defects noted, no edema  NEURO: Normal strength and tone, mentation intact and speech normal  PSYCH: mentation appears normal, affect normal/bright    No results found for any visits on 01/03/22.      "

## 2022-01-03 NOTE — PATIENT INSTRUCTIONS
"Assessment & Plan   Problem List Items Addressed This Visit        Circulatory    Benign essential hypertension - Primary    Relevant Medications    lisinopril (ZESTRIL) 20 MG tablet    Other Relevant Orders    Comprehensive Metobolic Panel (BFP)    Lipid Panel (BFP)    VENOUS COLLECTION      Other Visit Diagnoses     Nipple discharge        Relevant Orders    PROLACTIN ([facility])    Mammo diagnostic  digital (bilateral)*    US Breast Bilateral Complete 4 Quadrants    Radiology Referral         1. Benign essential hypertension  Controlled, check labs. She will followup for fasting labs. Refilled medications.  - lisinopril (ZESTRIL) 20 MG tablet; Take 1 tablet (20 mg) by mouth daily  Dispense: 90 tablet; Refill: 3  - Comprehensive Metobolic Panel (BFP); Future  - Lipid Panel (BFP); Future  - VENOUS COLLECTION; Future    2. Nipple discharge  Check prolactin level, diagnostic mammogram and ultrasound. Likely also referral to breast surgeon pending results.  - PROLACTIN ([facility]); Future  - Mammo diagnostic  digital (bilateral)*; Future  - US Breast Bilateral Complete 4 Quadrants; Future  - Radiology Referral; Future           BMI:   Estimated body mass index is 41.42 kg/m  as calculated from the following:    Height as of this encounter: 1.74 m (5' 8.5\").    Weight as of this encounter: 125.4 kg (276 lb 6.4 oz).         FUTURE APPOINTMENTS:       - Follow-up visit per results.    No follow-ups on file.    Unique Jensen MD  [facility]    "

## 2022-01-11 DIAGNOSIS — N64.52 NIPPLE DISCHARGE: ICD-10-CM

## 2022-01-11 DIAGNOSIS — I10 BENIGN ESSENTIAL HYPERTENSION: ICD-10-CM

## 2022-01-11 LAB
ALBUMIN SERPL-MCNC: 4.1 G/DL (ref 3.6–5.1)
ALBUMIN/GLOB SERPL: 1.5 {RATIO} (ref 1–2.5)
ALP SERPL-CCNC: 78 U/L (ref 33–130)
ALT 1742-6: 18 U/L (ref 0–32)
AST 1920-8: 9 U/L (ref 0–35)
BILIRUB SERPL-MCNC: 0.5 MG/DL (ref 0.2–1.2)
BUN SERPL-MCNC: 21 MG/DL (ref 7–25)
BUN/CREATININE RATIO: 23.1 (ref 6–22)
CALCIUM SERPL-MCNC: 9.4 MG/DL (ref 8.6–10.3)
CHLORIDE SERPLBLD-SCNC: 107 MMOL/L (ref 98–110)
CHOLEST SERPL-MCNC: 155 MG/DL (ref 0–199)
CHOLEST/HDLC SERPL: 3 {RATIO} (ref 0–5)
CO2 SERPL-SCNC: 26.3 MMOL/L (ref 20–32)
CREAT SERPL-MCNC: 0.91 MG/DL (ref 0.6–1.3)
GLOBULIN, CALCULATED - QUEST: 2.7 (ref 1.9–3.7)
GLUCOSE SERPL-MCNC: 107 MG/DL (ref 60–99)
HDLC SERPL-MCNC: 47 MG/DL (ref 40–150)
LDLC SERPL CALC-MCNC: 85 MG/DL (ref 0–130)
POTASSIUM SERPL-SCNC: 4.05 MMOL/L (ref 3.5–5.3)
PROT SERPL-MCNC: 6.8 G/DL (ref 6.1–8.1)
SODIUM SERPL-SCNC: 140.8 MMOL/L (ref 135–146)
TRIGL SERPL-MCNC: 117 MG/DL (ref 0–149)

## 2022-01-11 PROCEDURE — 80053 COMPREHEN METABOLIC PANEL: CPT | Performed by: FAMILY MEDICINE

## 2022-01-11 PROCEDURE — 80061 LIPID PANEL: CPT | Performed by: FAMILY MEDICINE

## 2022-01-11 PROCEDURE — 84146 ASSAY OF PROLACTIN: CPT | Mod: 90 | Performed by: FAMILY MEDICINE

## 2022-01-11 PROCEDURE — 36415 COLL VENOUS BLD VENIPUNCTURE: CPT | Performed by: FAMILY MEDICINE

## 2022-01-12 LAB — PROLACTIN - QUEST: 8.8 NG/ML

## 2022-01-13 ENCOUNTER — HOSPITAL ENCOUNTER (OUTPATIENT)
Dept: MAMMOGRAPHY | Facility: CLINIC | Age: 57
End: 2022-01-13
Attending: FAMILY MEDICINE
Payer: COMMERCIAL

## 2022-01-13 DIAGNOSIS — N64.52 NIPPLE DISCHARGE: ICD-10-CM

## 2022-01-13 PROCEDURE — 77062 BREAST TOMOSYNTHESIS BI: CPT

## 2022-01-13 PROCEDURE — 76642 ULTRASOUND BREAST LIMITED: CPT | Mod: LT

## 2022-01-24 ENCOUNTER — TELEPHONE (OUTPATIENT)
Dept: FAMILY MEDICINE | Facility: CLINIC | Age: 57
End: 2022-01-24

## 2022-01-24 DIAGNOSIS — N64.52 NIPPLE DISCHARGE: Primary | ICD-10-CM

## 2022-01-24 NOTE — TELEPHONE ENCOUNTER
Pt called to clarify why she needs to see a general surgeon. The radiologist told her that her mammogram was normal.    Please advise # [phone]    Thanks, Faina HOOD

## 2022-01-26 NOTE — TELEPHONE ENCOUNTER
I called her and discussed.  Note that phone number below is not correct. See patients chart phone number.

## 2022-01-27 ENCOUNTER — TELEPHONE (OUTPATIENT)
Dept: SURGERY | Facility: CLINIC | Age: 57
End: 2022-01-27
Payer: COMMERCIAL

## 2022-01-27 NOTE — TELEPHONE ENCOUNTER
Referral received from Unique Jensen for nipple discharge. Confirm if pt prefers [place] or [place]    Attempt #1:    Called patient at [phone] (home)  .  No answer - left message for patient to return call to clinic [phone].

## 2022-02-07 NOTE — PROGRESS NOTES
HPI:  Michelle Grant is a 57 year old female referred to me by Dr Jensen for consultation regarding a new left breast with scant bloody nipple discharge.  Michelle had an abnormality found on breast self exam six months ago, sporatic and she needs to express the duct to get the fluid out.  She reports that the finding is Unchanged since it was first discovered.   She reports discharge in the affected breast.  She reports no discharge, mass and pain in the contralateral, right breast.      Her risk factors for breast cancer include:  Nulliparous.  Family history is limited since her dad was adopted.  No family history of breast or ovarian cancer in her mother's side.      Past Medical History:   has a past medical history of Benign essential hypertension (6/4/2019) and Morbid obesity (H) (7/30/2018).     Smoking History:  has never smoked.    Past Surgical History:  Past Surgical History:   Procedure Laterality Date     HC LAPAROSCOPY, SURGICAL; CHOLECYSTECTOMY  2003    Cholecystectomy, Laparoscopic     ZZC EXCIS UTERINE FIBROID,ABD APPRCH  1991     ZZC TOTAL ABDOM HYSTERECTOMY  2010    Hysterectomy ovaries left in        ROS:  10 point ROS is negative except as stated in the History of the Present Illness.    PE:  Vitals: There were no vitals taken for this visit.  General appearance: well-nourished, sitting comfortably, no apparent distress  Neck: Supple   Respirations:  Unlabored  Extremities: Without edema  Neurologic: alert, speech is clear, moves all extremities with good strength  Psychiatric: Mood and affect are appropriate  Skin: Without lesions or rashes  Breast:   Appearance-  Symmetrical with no skin or nipple changes.  Contour is normal, pendulous.  Fibrocystic tissue is mild.  Masses- left, nipple - tiny 5 mm bluish discoloration of the central and upper nipple with a pinpoint duct opening with dried blood.   Palpable and very superficial.  Lymph:   No supraclavicular/infraclavicular adenopathy.                    Axilla: Palpable adenopathy: Right - None                                                                Left - None  Imaging:  All films personally reviewed with Michelle   Mammography reveals:  Scattered density.  Negative imaging with tomograms    US reveals: Normal left breast    Assessment:     Michelle is a 57 year old female with a Left breast bloody nipple discharge and a tiny superficial nipple mass.  This is clinically consistent with intraductal papilloma.    PLAN:  Recommend for nipple exploration, duct excision.    Yolande Bello MD    Please route or send letter to:  Primary Care Provider (PCP)

## 2022-02-09 ENCOUNTER — OFFICE VISIT (OUTPATIENT)
Dept: SURGERY | Facility: CLINIC | Age: 57
End: 2022-02-09
Payer: COMMERCIAL

## 2022-02-09 ENCOUNTER — TELEPHONE (OUTPATIENT)
Dept: SURGERY | Facility: CLINIC | Age: 57
End: 2022-02-09

## 2022-02-09 VITALS
RESPIRATION RATE: 16 BRPM | WEIGHT: 275 LBS | DIASTOLIC BLOOD PRESSURE: 88 MMHG | HEIGHT: 69 IN | BODY MASS INDEX: 40.73 KG/M2 | OXYGEN SATURATION: 97 % | SYSTOLIC BLOOD PRESSURE: 136 MMHG | HEART RATE: 77 BPM

## 2022-02-09 DIAGNOSIS — Z11.59 ENCOUNTER FOR SCREENING FOR OTHER VIRAL DISEASES: Primary | ICD-10-CM

## 2022-02-09 DIAGNOSIS — N64.52 BLOODY DISCHARGE FROM LEFT NIPPLE: Primary | ICD-10-CM

## 2022-02-09 PROCEDURE — 99242 OFF/OP CONSLTJ NEW/EST SF 20: CPT | Performed by: SURGERY

## 2022-02-09 ASSESSMENT — MIFFLIN-ST. JEOR: SCORE: 1893.83

## 2022-02-09 NOTE — LETTER
[facility]    [address], [address]  [place], [place] [address]  Phone [phone]  Fax [phone]  [facility] [place] [address]  Phone [phone]  Fax [phone]    [email or web address]             Michelle Grant     You have been scheduled for a COVID-19 testing appointment at [facility].    2-24-22 at 9:00 am     The clinic is located at:  [address]  [place]  [address]         Please wear a mask or face covering to the testing site.  Have your ID out and ready to show staff when you arrive.    Following your testing, you will be required to self-isolate until your surgery.  If you need a note for your employer due to this, please let us know.

## 2022-02-09 NOTE — TELEPHONE ENCOUNTER
Type of surgery: LEFT BREAST NIPPLE EXPLORATION, DUCT EXCISION   Location of surgery: [facility]  Date and time of surgery: 2-28-22, 1:50 PM   Surgeon: DR. VIRK   Pre-Op Appt Date: PATIENT TO SCHEDULE   Post-Op Appt Date: PATIENT TO SCHEDULE    Packet sent out: GIVEN TO PATIENT   Pre-cert/Authorization completed:  Not Applicable  Date: 2-9-22      LEFT BREAST NIPPLE EXPLORATION, DUCT EXCISION   GENERAL  PT INST TO HAVE H&P WITH DR. GONZALEZ   60 MINS REQ  PA ASSIST [facility]  [facility]

## 2022-02-09 NOTE — LETTER
2022    RE: Michelle Grant, : 1965      HPI:  Michelle Grant is a 57 year old female referred to me by Dr Jensen for consultation regarding a new left breast with scant bloody nipple discharge.  Michelle had an abnormality found on breast self exam six months ago, sporatic and she needs to express the duct to get the fluid out.  She reports that the finding is Unchanged since it was first discovered.   She reports discharge in the affected breast.  She reports no discharge, mass and pain in the contralateral, right breast.       Her risk factors for breast cancer include:  Nulliparous.  Family history is limited since her dad was adopted.  No family history of breast or ovarian cancer in her mother's side.      Past Medical History:   has a past medical history of Benign essential hypertension (2019) and Morbid obesity (H) (2018).      Smoking History:  Has never smoked.      ROS:  10 point ROS is negative except as stated in the History of the Present Illness.     PE:  Vitals: There were no vitals taken for this visit.  General appearance: well-nourished, sitting comfortably, no apparent distress  Neck: Supple   Respirations:  Unlabored  Extremities: Without edema  Neurologic: alert, speech is clear, moves all extremities with good strength  Psychiatric: Mood and affect are appropriate  Skin: Without lesions or rashes  Breast:   Appearance-  Symmetrical with no skin or nipple changes.  Contour is normal, pendulous.  Fibrocystic tissue is mild.  Masses- left, nipple - tiny 5 mm bluish discoloration of the central and upper nipple with a pinpoint duct opening with dried blood.   Palpable and very superficial.  Lymph:   No supraclavicular/infraclavicular adenopathy.                   Axilla: Palpable adenopathy: Right - None                                                                Left - None  Imaging:  All films personally reviewed with Michelle   Mammography reveals:  Scattered  density.  Negative imaging with tomograms     US reveals: Normal left breast     Assessment:     Michelle is a 57 year old female with a Left breast bloody nipple discharge and a tiny superficial nipple mass.  This is clinically consistent with intraductal papilloma.     PLAN:  Recommend for nipple exploration, duct excision.         Yolande Bello MD

## 2022-02-09 NOTE — PROGRESS NOTES
Breast Patients      BREAST PATIENTS (FEMALE)    At what age did your periods begin? Under 17    What was the date of your last menstrual period? Hysterectomy 4/19/10 but was on depo for 20 yrs    Have you begun menopause? Yes  Age Menopause began:  Around time of hysterectomy     Are you using hormone replacement therapy?  No    Number of full-term pregnancies: 0    Did you nurse your children? N/A    Are you pregnant now? No    Do you have breast implants? No         BREAST PATIENTS (ALL)    Have you had a previous breast biopsy? Yes  Side: left ? Date: 20 years ago     Have you had previous Breast Cancer? No

## 2022-02-12 ENCOUNTER — HEALTH MAINTENANCE LETTER (OUTPATIENT)
Age: 57
End: 2022-02-12

## 2022-02-23 ENCOUNTER — OFFICE VISIT (OUTPATIENT)
Dept: FAMILY MEDICINE | Facility: CLINIC | Age: 57
End: 2022-02-23

## 2022-02-23 VITALS
DIASTOLIC BLOOD PRESSURE: 78 MMHG | TEMPERATURE: 98.9 F | HEIGHT: 69 IN | WEIGHT: 280.4 LBS | SYSTOLIC BLOOD PRESSURE: 136 MMHG | BODY MASS INDEX: 41.53 KG/M2 | OXYGEN SATURATION: 97 % | HEART RATE: 78 BPM

## 2022-02-23 DIAGNOSIS — G43.909 MIGRAINE SYNDROME: ICD-10-CM

## 2022-02-23 DIAGNOSIS — I10 BENIGN ESSENTIAL HYPERTENSION: ICD-10-CM

## 2022-02-23 DIAGNOSIS — N64.52 NIPPLE DISCHARGE: ICD-10-CM

## 2022-02-23 DIAGNOSIS — Z84.89 FAMILY HISTORY OF MALIGNANT HYPERTHERMIA: ICD-10-CM

## 2022-02-23 DIAGNOSIS — Z01.818 PREOPERATIVE EXAMINATION: Primary | ICD-10-CM

## 2022-02-23 LAB
% GRANULOCYTES: 69.6 %
HCT VFR BLD AUTO: 40.7 % (ref 35–47)
HEMOGLOBIN: 13.6 G/DL (ref 11.7–15.7)
LYMPHOCYTES NFR BLD AUTO: 22.8 %
MCH RBC QN AUTO: 30.7 PG (ref 26–33)
MCHC RBC AUTO-ENTMCNC: 33.4 G/DL (ref 31–36)
MCV RBC AUTO: 91.8 FL (ref 78–100)
MONOCYTES NFR BLD AUTO: 7.6 %
PLATELET COUNT - QUEST: 260 10^9/L (ref 150–375)
RBC # BLD AUTO: 4.43 10*12/L (ref 3.8–5.2)
WBC # BLD AUTO: 7.8 10*9/L (ref 4–11)

## 2022-02-23 PROCEDURE — 99214 OFFICE O/P EST MOD 30 MIN: CPT | Performed by: FAMILY MEDICINE

## 2022-02-23 PROCEDURE — 85025 COMPLETE CBC W/AUTO DIFF WBC: CPT | Performed by: FAMILY MEDICINE

## 2022-02-23 PROCEDURE — 36415 COLL VENOUS BLD VENIPUNCTURE: CPT | Performed by: FAMILY MEDICINE

## 2022-02-23 NOTE — NURSING NOTE
Chief Complaint   Patient presents with     Pre-Op Exam     Breast biopsy on 02/28 at [facility] ([place], [place]) with Dr. Bello

## 2022-02-23 NOTE — PROGRESS NOTES
[facility]  [address] [place] [address]  [address]  [facility] [address]  Phone: [phone]  Fax: [phone]  Primary Provider: Unique Jensen  Pre-op Performing Provider: UNIQUE JENSEN      PREOPERATIVE EVALUATION:  Today's date: 2022    Michelle Grant is a 57 year old female who presents for a preoperative evaluation. ( 67)    Surgical Information:  Surgery/Procedure: Breast biopsy, nipple exploration, duct excision  Surgery Location: [facility]  Surgeon: Dr. Bello  Surgery Date: 22  Time of Surgery: 1:50PM  Where patient plans to recover: At home with family  Fax number for surgical facility: Note does not need to be faxed, will be available electronically in Epic.    Type of Anesthesia Anticipated: to be determined    Assessment & Plan     The proposed surgical procedure is considered INTERMEDIATE risk.    Problem List Items Addressed This Visit        Nervous and Auditory    Migraine syndrome       Circulatory    Benign essential hypertension       Other    Family history of malignant hyperthermia      Other Visit Diagnoses     Preoperative examination    -  Primary    Relevant Orders    VENOUS COLLECTION (Completed)    HEMOGRAM PLATELET DIFF (BFP) (Completed)    Basic Metabolic Panel (BFP) (Completed)    Nipple discharge            1. Preoperative examination  - VENOUS COLLECTION  - HEMOGRAM PLATELET DIFF (BFP)  - Basic Metabolic Panel (BFP)    2. Nipple discharge      3. Migraine syndrome    4. Family history of malignant hyperthermia      5. Benign essential hypertension  Controlled.    Possible Sleep Apnea:    No flowsheet data found.       Risks and Recommendations:  The patient has the following additional risks and recommendations for perioperative complications:   - No identified additional risk factors other than previously addressed    Medication Instructions:  Patient is to take all scheduled medications on the day of  surgery    RECOMMENDATION:  APPROVAL GIVEN to proceed with proposed procedure, without further diagnostic evaluation.      Subjective     HPI related to upcoming procedure: left breast discharge, it was bloody, was told this is a papilloma. Doesn't think this is cancerous but wants to take this out. Has had discharge for several months.    1. No - Have you ever had a heart attack or stroke?  2. No - Have you ever had surgery on your heart or blood vessels, such as a stent, coronary (heart) bypass, or surgery on an artery in the head, neck, heart, or legs?  3. No - Do you have chest pain when you are physically active?  4. No - Do you have a history of heart failure?  5. No - Do you currently have a cold, bronchitis, or symptoms of other respiratory (head and chest) infections?  6. No - Do you have a cough, shortness of breath, or wheezing?  7. No - Do you or anyone in your family have a history of blood clots?  8. No - Do you or anyone in your family have a serious bleeding problem, such as long-lasting bleeding after surgeries or cuts?  9. Yes - Have you ever had anemia or been told to take iron pills? History of anemia but now has had hysterectomy  10. No - Have you had any abnormal blood loss such as black, tarry or bloody stools, or abnormal vaginal bleeding?  11. No - Have you ever had a blood transfusion?  12. Yes - Are you willing to have a blood transfusion if it is medically needed before, during, or after your surgery?  13. Yes - Have you or anyone in your family ever had problems with anesthesia (sedation for surgery)? Brother with malignant hyperthermia  14. Yes - Do you have sleep apnea, excessive snoring, or daytime drowsiness? Snoring. Do you have a CPAP machine?   15. No - Do you have any artifical heart valves or other implanted medical devices, such as a pacemaker, defibrillator, or continuous glucose monitor?  16. No - Do you have any artifical joints?  17. No - Are you allergic to latex?  18. No  - Is there any chance that you may be pregnant?    Health Care Directive:  Patient does not have a Health Care Directive or Living Will: Discussed advance care planning with patient; information given to patient to review.    Preoperative Review of :   reviewed - no record of controlled substances prescribed.      Status of Chronic Conditions:  HYPERTENSION - Patient has longstanding history of HTN , currently denies any symptoms referable to elevated blood pressure. Specifically denies chest pain, palpitations, dyspnea, orthopnea, PND or peripheral edema. Blood pressure readings have been in normal range. Current medication regimen is as listed below. Patient denies any side effects of medication.       Review of Systems  CONSTITUTIONAL: NEGATIVE for fever, chills, change in weight  INTEGUMENTARY/SKIN: NEGATIVE for worrisome rashes, moles or lesions  EYES: NEGATIVE for vision changes or irritation  ENT/MOUTH: NEGATIVE for ear, mouth and throat problems  RESP: NEGATIVE for significant cough or SOB  CV: NEGATIVE for chest pain, palpitations or peripheral edema  GI: NEGATIVE for nausea, abdominal pain, heartburn, or change in bowel habits  : NEGATIVE for frequency, dysuria, or hematuria  MUSCULOSKELETAL: NEGATIVE for significant arthralgias or myalgia  NEURO: NEGATIVE for weakness, dizziness or paresthesias  ENDOCRINE: NEGATIVE for temperature intolerance, skin/hair changes  HEME: NEGATIVE for bleeding problems  PSYCHIATRIC: NEGATIVE for changes in mood or affect    Patient Active Problem List    Diagnosis Date Noted     Migraine syndrome 02/23/2022     Priority: Medium     Family history of malignant hyperthermia 02/23/2022     Priority: Medium     Benign essential hypertension 06/04/2019     Priority: Medium     Morbid obesity (H) 07/30/2018     Priority: Medium     ACP (advance care planning) 01/07/2015     Priority: Medium     Advance Care Planning 5/17/2016: ACP Review of Chart / Resources Provided:   Reviewed chart for advance care plan.  Michelle Grant has no plan or code status on file. Discussed available resources and provided with information. Confirmed code status reflects current choices pending further ACP discussions.  Confirmed/documented legally designated decision makers.  Added by Elif Vaz                         CARDIOVASCULAR SCREENING; LDL GOAL LESS THAN 160 10/31/2010     Priority: Medium     Health Care Home 01/07/2015     Priority: Low      Past Medical History:   Diagnosis Date     Benign essential hypertension 6/4/2019     Morbid obesity (H) 7/30/2018     Past Surgical History:   Procedure Laterality Date     HC LAPAROSCOPY, SURGICAL; CHOLECYSTECTOMY  01/01/2003    Cholecystectomy, Laparoscopic     WISDOM TOOTH EXTRACTION       ZZC EXCIS UTERINE FIBROID,ABD APPRCH  01/01/1991     ZZC TOTAL ABDOM HYSTERECTOMY  01/01/2010    Hysterectomy ovaries left in     Current Outpatient Medications   Medication Sig Dispense Refill     lisinopril (ZESTRIL) 20 MG tablet Take 1 tablet (20 mg) by mouth daily 90 tablet 3     multivitamin w/minerals (MULTI-VITAMIN) tablet Take 1 tablet by mouth daily         No Known Allergies     Social History     Tobacco Use     Smoking status: Never Smoker     Smokeless tobacco: Never Used   Substance Use Topics     Alcohol use: Yes     Alcohol/week: 0.8 standard drinks     Types: 1 Standard drinks or equivalent per week     Comment: 2-3 drnks per month     Family History   Problem Relation Age of Onset     Gastrointestinal Disease Mother      Hypertension Mother      Lipids Mother      C.A.D. Father      Hypertension Father      Lipids Father      Neurologic Disorder Father         memory issues unknown etiology     Malignant Hyperthermia Brother      Cancer - colorectal Other      History   Drug Use No         Objective     /78 (BP Location: Right arm, Patient Position: Sitting, Cuff Size: Adult Large)   Pulse 78   Temp 98.9  F (37.2  C) (Temporal)   Ht  "1.74 m (5' 8.5\")   Wt 127.2 kg (280 lb 6.4 oz)   SpO2 97%   BMI 42.01 kg/m      Physical Exam    GENERAL APPEARANCE: healthy, alert and no distress     EYES: EOMI, PERRL     HENT: ear canals and TM's normal and nose and mouth without ulcers or lesions     NECK: no adenopathy, no asymmetry, masses, or scars and thyroid normal to palpation     RESP: lungs clear to auscultation - no rales, rhonchi or wheezes     CV: regular rates and rhythm, normal S1 S2, no S3 or S4 and no murmur, click or rub     ABDOMEN:  soft, nontender, no HSM or masses and bowel sounds normal     MS: extremities normal- no gross deformities noted, no evidence of inflammation in joints, FROM in all extremities.     SKIN: no suspicious lesions or rashes     NEURO: Normal strength and tone, sensory exam grossly normal, mentation intact and speech normal     PSYCH: mentation appears normal. and affect normal/bright     LYMPHATICS: No cervical adenopathy    Recent Labs   Lab Test 01/11/22  1051 01/07/21  1549 01/07/21  0000   HGB  --  12.4  --    .8  --  141.6   POTASSIUM 4.05  --  4.75   CR 0.91  --  0.91        Diagnostics:  Recent Results (from the past 240 hour(s))   HEMOGRAM PLATELET DIFF (BFP)    Collection Time: 02/23/22 12:00 AM   Result Value Ref Range    WBC 7.8 4.0 - 11 10*9/L    RBC Count 4.43 3.8 - 5.2 10*12/L    Hemoglobin 13.6 11.7 - 15.7 g/dL    Hematocrit 40.7 35.0 - 47.0 %    MCV 91.8 78 - 100 fL    MCH 30.7 26 - 33 pg    MCHC 33.4 31 - 36 g/dL    Platelet Count 260 150 - 375 10^9/L    % Granulocytes 69.6 %    % Lymphocytes 22.8 %    % Monocytes 7.6 %   Basic Metabolic Panel (BFP)    Collection Time: 02/24/22 12:00 AM   Result Value Ref Range    Carbon Dioxide 28.1 20 - 32 mmol/L    Creatinine 1.01 0.60 - 1.30 mg/dL    Glucose 130 (A) 60 - 99 mg/dL    Sodium 141.7 135 - 146 mmol/L    Potassium 4.72 3.5 - 5.3 mmol/L    Chloride 105.8 98 - 110 mmol/L    Urea Nitrogen 18 7 - 25 mg/dL    Calcium 9.3 8.6 - 10.3 mg/dL    " BUN/Creatinine Ratio 17.8 6 - 22   Asymptomatic COVID-19 Virus (Coronavirus) by PCR Nose    Collection Time: 02/24/22  4:07 PM    Specimen: Nose; Swab   Result Value Ref Range    SARS CoV2 PCR Negative Negative, Testing sent to reference lab. Results will be returned via unsolicited result          Revised Cardiac Risk Index (RCRI):  The patient has the following serious cardiovascular risks for perioperative complications:   - No serious cardiac risks = 0 points     RCRI Interpretation: 0 points: Class I (very low risk - 0.4% complication rate)           Signed Electronically by: Unique Jensen MD  Copy of this evaluation report is provided to requesting physician.

## 2022-02-24 ENCOUNTER — LAB (OUTPATIENT)
Dept: LAB | Facility: CLINIC | Age: 57
End: 2022-02-24
Payer: COMMERCIAL

## 2022-02-24 DIAGNOSIS — Z11.59 ENCOUNTER FOR SCREENING FOR OTHER VIRAL DISEASES: ICD-10-CM

## 2022-02-24 LAB
BUN SERPL-MCNC: 18 MG/DL (ref 7–25)
BUN/CREATININE RATIO: 17.8 (ref 6–22)
CALCIUM SERPL-MCNC: 9.3 MG/DL (ref 8.6–10.3)
CHLORIDE SERPLBLD-SCNC: 105.8 MMOL/L (ref 98–110)
CO2 SERPL-SCNC: 28.1 MMOL/L (ref 20–32)
CREAT SERPL-MCNC: 1.01 MG/DL (ref 0.6–1.3)
GLUCOSE SERPL-MCNC: 130 MG/DL (ref 60–99)
POTASSIUM SERPL-SCNC: 4.72 MMOL/L (ref 3.5–5.3)
SODIUM SERPL-SCNC: 141.7 MMOL/L (ref 135–146)

## 2022-02-24 PROCEDURE — U0003 INFECTIOUS AGENT DETECTION BY NUCLEIC ACID (DNA OR RNA); SEVERE ACUTE RESPIRATORY SYNDROME CORONAVIRUS 2 (SARS-COV-2) (CORONAVIRUS DISEASE [COVID-19]), AMPLIFIED PROBE TECHNIQUE, MAKING USE OF HIGH THROUGHPUT TECHNOLOGIES AS DESCRIBED BY CMS-2020-01-R: HCPCS

## 2022-02-24 PROCEDURE — U0005 INFEC AGEN DETEC AMPLI PROBE: HCPCS

## 2022-02-25 ENCOUNTER — TELEPHONE (OUTPATIENT)
Dept: FAMILY MEDICINE | Facility: CLINIC | Age: 57
End: 2022-02-25

## 2022-02-25 LAB — SARS-COV-2 RNA RESP QL NAA+PROBE: NEGATIVE

## 2022-02-28 ENCOUNTER — ANESTHESIA EVENT (OUTPATIENT)
Dept: SURGERY | Facility: CLINIC | Age: 57
End: 2022-02-28
Payer: COMMERCIAL

## 2022-02-28 ENCOUNTER — HOSPITAL ENCOUNTER (OUTPATIENT)
Facility: CLINIC | Age: 57
Discharge: HOME | End: 2022-02-28
Attending: SURGERY | Admitting: SURGERY
Payer: COMMERCIAL

## 2022-02-28 ENCOUNTER — APPOINTMENT (OUTPATIENT)
Dept: SURGERY | Facility: PHYSICIAN GROUP | Age: 57
End: 2022-02-28
Payer: COMMERCIAL

## 2022-02-28 ENCOUNTER — ANESTHESIA (OUTPATIENT)
Dept: SURGERY | Facility: CLINIC | Age: 57
End: 2022-02-28
Payer: COMMERCIAL

## 2022-02-28 VITALS
OXYGEN SATURATION: 99 % | HEIGHT: 68 IN | HEART RATE: 52 BPM | TEMPERATURE: 97.6 F | BODY MASS INDEX: 41.83 KG/M2 | SYSTOLIC BLOOD PRESSURE: 143 MMHG | RESPIRATION RATE: 18 BRPM | DIASTOLIC BLOOD PRESSURE: 83 MMHG | WEIGHT: 276 LBS

## 2022-02-28 DIAGNOSIS — N64.52 BLOODY DISCHARGE FROM LEFT NIPPLE: ICD-10-CM

## 2022-02-28 PROCEDURE — 272N000001 HC OR GENERAL SUPPLY STERILE: Performed by: SURGERY

## 2022-02-28 PROCEDURE — 88305 TISSUE EXAM BY PATHOLOGIST: CPT | Mod: TC | Performed by: SURGERY

## 2022-02-28 PROCEDURE — 360N000076 HC SURGERY LEVEL 3, PER MIN: Performed by: SURGERY

## 2022-02-28 PROCEDURE — 370N000017 HC ANESTHESIA TECHNICAL FEE, PER MIN: Performed by: SURGERY

## 2022-02-28 PROCEDURE — 250N000009 HC RX 250: Performed by: NURSE ANESTHETIST, CERTIFIED REGISTERED

## 2022-02-28 PROCEDURE — 250N000011 HC RX IP 250 OP 636: Performed by: NURSE ANESTHETIST, CERTIFIED REGISTERED

## 2022-02-28 PROCEDURE — 88342 IMHCHEM/IMCYTCHM 1ST ANTB: CPT | Mod: 26 | Performed by: PATHOLOGY

## 2022-02-28 PROCEDURE — 258N000003 HC RX IP 258 OP 636: Performed by: NURSE ANESTHETIST, CERTIFIED REGISTERED

## 2022-02-28 PROCEDURE — 250N000009 HC RX 250: Performed by: SURGERY

## 2022-02-28 PROCEDURE — 710N000012 HC RECOVERY PHASE 2, PER MINUTE: Performed by: SURGERY

## 2022-02-28 PROCEDURE — 88305 TISSUE EXAM BY PATHOLOGIST: CPT | Mod: 26 | Performed by: PATHOLOGY

## 2022-02-28 PROCEDURE — 19110 NIPPLE EXPLORATION: CPT | Mod: LT | Performed by: SURGERY

## 2022-02-28 PROCEDURE — 250N000011 HC RX IP 250 OP 636: Performed by: SURGERY

## 2022-02-28 PROCEDURE — 88341 IMHCHEM/IMCYTCHM EA ADD ANTB: CPT | Mod: 26 | Performed by: PATHOLOGY

## 2022-02-28 PROCEDURE — 999N000141 HC STATISTIC PRE-PROCEDURE NURSING ASSESSMENT: Performed by: SURGERY

## 2022-02-28 RX ORDER — OXYCODONE HYDROCHLORIDE 5 MG/1
5 TABLET ORAL EVERY 4 HOURS PRN
Status: DISCONTINUED | OUTPATIENT
Start: 2022-02-28 | End: 2022-02-28 | Stop reason: HOSPADM

## 2022-02-28 RX ORDER — ACETAMINOPHEN 325 MG/1
975 TABLET ORAL ONCE
Status: DISCONTINUED | OUTPATIENT
Start: 2022-02-28 | End: 2022-02-28 | Stop reason: HOSPADM

## 2022-02-28 RX ORDER — FENTANYL CITRATE 50 UG/ML
25 INJECTION, SOLUTION INTRAMUSCULAR; INTRAVENOUS
Status: DISCONTINUED | OUTPATIENT
Start: 2022-02-28 | End: 2022-02-28 | Stop reason: HOSPADM

## 2022-02-28 RX ORDER — HYDROCODONE BITARTRATE AND ACETAMINOPHEN 5; 325 MG/1; MG/1
1 TABLET ORAL
Status: DISCONTINUED | OUTPATIENT
Start: 2022-02-28 | End: 2022-02-28 | Stop reason: HOSPADM

## 2022-02-28 RX ORDER — HYDROCODONE BITARTRATE AND ACETAMINOPHEN 5; 325 MG/1; MG/1
1-2 TABLET ORAL EVERY 4 HOURS PRN
Qty: 10 TABLET | Refills: 0 | Status: SHIPPED | OUTPATIENT
Start: 2022-02-28 | End: 2022-10-06

## 2022-02-28 RX ORDER — SODIUM CHLORIDE, SODIUM LACTATE, POTASSIUM CHLORIDE, CALCIUM CHLORIDE 600; 310; 30; 20 MG/100ML; MG/100ML; MG/100ML; MG/100ML
INJECTION, SOLUTION INTRAVENOUS CONTINUOUS
Status: DISCONTINUED | OUTPATIENT
Start: 2022-02-28 | End: 2022-02-28 | Stop reason: HOSPADM

## 2022-02-28 RX ORDER — FENTANYL CITRATE 50 UG/ML
25 INJECTION, SOLUTION INTRAMUSCULAR; INTRAVENOUS EVERY 5 MIN PRN
Status: DISCONTINUED | OUTPATIENT
Start: 2022-02-28 | End: 2022-02-28 | Stop reason: HOSPADM

## 2022-02-28 RX ORDER — ALBUTEROL SULFATE 0.83 MG/ML
2.5 SOLUTION RESPIRATORY (INHALATION) EVERY 4 HOURS PRN
Status: DISCONTINUED | OUTPATIENT
Start: 2022-02-28 | End: 2022-02-28 | Stop reason: HOSPADM

## 2022-02-28 RX ORDER — HYDROMORPHONE HCL IN WATER/PF 6 MG/30 ML
0.2 PATIENT CONTROLLED ANALGESIA SYRINGE INTRAVENOUS EVERY 5 MIN PRN
Status: DISCONTINUED | OUTPATIENT
Start: 2022-02-28 | End: 2022-02-28 | Stop reason: HOSPADM

## 2022-02-28 RX ORDER — NALOXONE HYDROCHLORIDE 0.4 MG/ML
0.2 INJECTION, SOLUTION INTRAMUSCULAR; INTRAVENOUS; SUBCUTANEOUS
Status: DISCONTINUED | OUTPATIENT
Start: 2022-02-28 | End: 2022-02-28 | Stop reason: HOSPADM

## 2022-02-28 RX ORDER — ONDANSETRON 2 MG/ML
4 INJECTION INTRAMUSCULAR; INTRAVENOUS EVERY 30 MIN PRN
Status: DISCONTINUED | OUTPATIENT
Start: 2022-02-28 | End: 2022-02-28 | Stop reason: HOSPADM

## 2022-02-28 RX ORDER — DIMENHYDRINATE 50 MG/ML
25 INJECTION, SOLUTION INTRAMUSCULAR; INTRAVENOUS
Status: DISCONTINUED | OUTPATIENT
Start: 2022-02-28 | End: 2022-02-28 | Stop reason: HOSPADM

## 2022-02-28 RX ORDER — LIDOCAINE 40 MG/G
CREAM TOPICAL
Status: DISCONTINUED | OUTPATIENT
Start: 2022-02-28 | End: 2022-02-28 | Stop reason: HOSPADM

## 2022-02-28 RX ORDER — SODIUM CHLORIDE, SODIUM LACTATE, POTASSIUM CHLORIDE, CALCIUM CHLORIDE 600; 310; 30; 20 MG/100ML; MG/100ML; MG/100ML; MG/100ML
INJECTION, SOLUTION INTRAVENOUS CONTINUOUS PRN
Status: DISCONTINUED | OUTPATIENT
Start: 2022-02-28 | End: 2022-02-28

## 2022-02-28 RX ORDER — NALOXONE HYDROCHLORIDE 0.4 MG/ML
0.4 INJECTION, SOLUTION INTRAMUSCULAR; INTRAVENOUS; SUBCUTANEOUS
Status: DISCONTINUED | OUTPATIENT
Start: 2022-02-28 | End: 2022-02-28 | Stop reason: HOSPADM

## 2022-02-28 RX ORDER — LIDOCAINE HYDROCHLORIDE 10 MG/ML
INJECTION, SOLUTION INFILTRATION; PERINEURAL PRN
Status: DISCONTINUED | OUTPATIENT
Start: 2022-02-28 | End: 2022-02-28

## 2022-02-28 RX ORDER — ONDANSETRON 4 MG/1
4 TABLET, ORALLY DISINTEGRATING ORAL EVERY 30 MIN PRN
Status: DISCONTINUED | OUTPATIENT
Start: 2022-02-28 | End: 2022-02-28 | Stop reason: HOSPADM

## 2022-02-28 RX ORDER — PROPOFOL 10 MG/ML
INJECTION, EMULSION INTRAVENOUS CONTINUOUS PRN
Status: DISCONTINUED | OUTPATIENT
Start: 2022-02-28 | End: 2022-02-28

## 2022-02-28 RX ORDER — DEXAMETHASONE SODIUM PHOSPHATE 4 MG/ML
INJECTION, SOLUTION INTRA-ARTICULAR; INTRALESIONAL; INTRAMUSCULAR; INTRAVENOUS; SOFT TISSUE PRN
Status: DISCONTINUED | OUTPATIENT
Start: 2022-02-28 | End: 2022-02-28

## 2022-02-28 RX ORDER — FENTANYL CITRATE 50 UG/ML
INJECTION, SOLUTION INTRAMUSCULAR; INTRAVENOUS PRN
Status: DISCONTINUED | OUTPATIENT
Start: 2022-02-28 | End: 2022-02-28

## 2022-02-28 RX ORDER — ONDANSETRON 2 MG/ML
INJECTION INTRAMUSCULAR; INTRAVENOUS PRN
Status: DISCONTINUED | OUTPATIENT
Start: 2022-02-28 | End: 2022-02-28

## 2022-02-28 RX ORDER — ACETAMINOPHEN 325 MG/1
650 TABLET ORAL
Status: DISCONTINUED | OUTPATIENT
Start: 2022-02-28 | End: 2022-02-28 | Stop reason: HOSPADM

## 2022-02-28 RX ORDER — CEFAZOLIN SODIUM IN 0.9 % NACL 3 G/100 ML
3 INTRAVENOUS SOLUTION, PIGGYBACK (ML) INTRAVENOUS
Status: COMPLETED | OUTPATIENT
Start: 2022-02-28 | End: 2022-02-28

## 2022-02-28 RX ORDER — CEFAZOLIN SODIUM IN 0.9 % NACL 3 G/100 ML
3 INTRAVENOUS SOLUTION, PIGGYBACK (ML) INTRAVENOUS SEE ADMIN INSTRUCTIONS
Status: DISCONTINUED | OUTPATIENT
Start: 2022-02-28 | End: 2022-02-28 | Stop reason: HOSPADM

## 2022-02-28 RX ORDER — GLYCOPYRROLATE 0.2 MG/ML
INJECTION, SOLUTION INTRAMUSCULAR; INTRAVENOUS PRN
Status: DISCONTINUED | OUTPATIENT
Start: 2022-02-28 | End: 2022-02-28

## 2022-02-28 RX ORDER — LABETALOL HYDROCHLORIDE 5 MG/ML
10 INJECTION, SOLUTION INTRAVENOUS
Status: DISCONTINUED | OUTPATIENT
Start: 2022-02-28 | End: 2022-02-28 | Stop reason: HOSPADM

## 2022-02-28 RX ORDER — MEPERIDINE HYDROCHLORIDE 25 MG/ML
12.5 INJECTION INTRAMUSCULAR; INTRAVENOUS; SUBCUTANEOUS
Status: DISCONTINUED | OUTPATIENT
Start: 2022-02-28 | End: 2022-02-28 | Stop reason: HOSPADM

## 2022-02-28 RX ADMIN — DEXAMETHASONE SODIUM PHOSPHATE 4 MG: 4 INJECTION, SOLUTION INTRA-ARTICULAR; INTRALESIONAL; INTRAMUSCULAR; INTRAVENOUS; SOFT TISSUE at 13:35

## 2022-02-28 RX ADMIN — PROPOFOL 100 MCG/KG/MIN: 10 INJECTION, EMULSION INTRAVENOUS at 13:24

## 2022-02-28 RX ADMIN — FENTANYL CITRATE 50 MCG: 50 INJECTION, SOLUTION INTRAMUSCULAR; INTRAVENOUS at 13:46

## 2022-02-28 RX ADMIN — FENTANYL CITRATE 50 MCG: 50 INJECTION, SOLUTION INTRAMUSCULAR; INTRAVENOUS at 13:24

## 2022-02-28 RX ADMIN — SODIUM CHLORIDE, POTASSIUM CHLORIDE, SODIUM LACTATE AND CALCIUM CHLORIDE: 600; 310; 30; 20 INJECTION, SOLUTION INTRAVENOUS at 12:51

## 2022-02-28 RX ADMIN — FENTANYL CITRATE 50 MCG: 50 INJECTION, SOLUTION INTRAMUSCULAR; INTRAVENOUS at 13:33

## 2022-02-28 RX ADMIN — FENTANYL CITRATE 50 MCG: 50 INJECTION, SOLUTION INTRAMUSCULAR; INTRAVENOUS at 13:41

## 2022-02-28 RX ADMIN — GLYCOPYRROLATE 0.1 MG: 0.2 INJECTION, SOLUTION INTRAMUSCULAR; INTRAVENOUS at 13:23

## 2022-02-28 RX ADMIN — MIDAZOLAM 2 MG: 1 INJECTION INTRAMUSCULAR; INTRAVENOUS at 13:20

## 2022-02-28 RX ADMIN — ONDANSETRON HYDROCHLORIDE 4 MG: 2 INJECTION, SOLUTION INTRAVENOUS at 13:23

## 2022-02-28 RX ADMIN — Medication 3 G: at 12:08

## 2022-02-28 RX ADMIN — LIDOCAINE HYDROCHLORIDE 40 MG: 10 INJECTION, SOLUTION INFILTRATION; PERINEURAL at 13:23

## 2022-02-28 RX ADMIN — FENTANYL CITRATE 50 MCG: 50 INJECTION, SOLUTION INTRAMUSCULAR; INTRAVENOUS at 13:28

## 2022-02-28 NOTE — DISCHARGE INSTRUCTIONS
HOME CARE FOLLOWING BREAST BIOPSY  MAMIE Sullivan, VANESSA Merino R. O Donnell, J. Shaheen    RESULTS:  You will receive a phone call from your surgeon or office staff with your pathology results.  Occasionally special testing must be done on the surgical specimen which may delay the posting of your final pathology report.  You may call for your final pathology report after 1p.m. three working days after surgery to check on its status.    INCISIONAL CARE:    If you have a dressing in place, keep clean and dry for 48 hours; you may replace the gauze if it becomes soiled.    After 48 hours you may remove the dressing and shower.  Do not submerse incision in water for 1 week.    If you have a Dermabond dressing (a type of skin glue), you may shower immediately.    Sutures will absorb and do not need to be removed.    If present, leave the steri-strips (white paper tapes) in place for 14 days after surgery.    If present, leave Dermabond glue in place until it wears/flakes off.    You may expect a small amount of drainage from your incision.    A lump/ridge under the incision is normal and will gradually resolve.    SUPPORT:  Wear a bra for support and comfort for 3-7 days, day and night.    ACTIVITY:  Cautiously resume exercise and strenuous activities such as jogging, tennis, aerobics, etc. Also, be careful of stretching activities with operative side for two weeks.    DIET:  Start with liquids and gradually resume your regular diet as tolerated.  Increased fluid intake is recommended. While taking pain medications, consider use of a stool softener, increase your fiber in your diet, or add a fiber supplement (like Metamucil, Citrucel) to help prevent constipation - a possible side effect of pain medications.    DISCOMFORT:  Local anesthetic placed at surgery should provide relief for 4-8 hours.  Begin taking pain pills before discomfort is severe.  Take the pain medication with some food, when  possible, to minimize side effects.  Intermittent use of ice packs may help during the first 1-3 weeks after surgery.  Expect gradual improvement.    Over-the-counter anti-inflammatory medications (i.e. Ibuprofen/Advil/Motrin or Naprosyn/Aleve) may be used per package instructions in addition to or while tapering off the narcotic pain medications to decrease swelling and sensitivity.  DO NOT TAKE these Anti-inflammatory medications if your primary physician has advised against doing so, or if you have acid reflux, ulcer, or bleeding disorder, or take blood-thinner medications.  Call your primary physician or the surgery office if you have medication questions.      FOLLOW-UP AFTER SURGERY:  -Our office will contact you approximately 2-3 weeks after surgery to check on your progress and answer any questions you may have.  If you are doing well, you will not need to return for an office appointment.  If any concerns are identified over the phone, we will help you make an appointment to see a provider.    -If you have not received a phone call, have any questions or concerns, or would like to be seen, please call us at [phone].  We are located at: [address], [address]; [place] [address]    -CONTACT US IF THE FOLLOWING DEVELOPS:   1. A fever that is above 101     2. Increased redness, warmth, drainage, bleeding, or swelling.   3. Pain that is not relieved by rest/ice and your prescription.   4.  Increasing pain after 48 hours.   5. Drainage that is thick, cloudy, yellow, green or white.   6. Any other questions or concerns.      FREQUENTLY ASKED QUESTIONS:    Q:  How should my incision look?    A:  Normally your incision will appear slightly swollen with light redness directly along the incision itself as it heals.  It may feel like a bump or ridge as the healing/scarring happens, and over time (3-4 months) this bump or ridge feeling should slowly go away.  In general, clear or pink watery drainage can  be normal at first as your incision heals, but should decrease over time.    Q:  How do I know if my incision is infected?  A:  Look at your incision for signs of infection, like redness around the incision spreading to surrounding skin, or drainage of cloudy or foul-smelling drainage.  If you feel warm, check your temperature to see if you are running a fever.    **If any of these things occur, please notify the nurse at our office.  We may need you to come into the office for an incision check.      Q:  How do I take care of my incision?  A:  If you have a dressing in place - Starting the day after surgery, replace the dressing 1-2 times a day until there is no further drainage from the incision.  At that time, a dressing is no longer needed.  Try to minimize tape on the skin if irritation is occurring at the tape sites.  If you have significant irritation from tape on the skin, please call the office to discuss other method of dressing your incision.    Small pieces of tape called  steri-strips  may be present directly overlying your incision; these may be removed 10 days after surgery unless otherwise specified by your surgeon.  If these tapes start to loosen at the ends, you may trim them back until they fall off or are removed.    A:  If you had  Dermabond  tissue glue used as a dressing (this causes your incision to look shiny with a clear covering over it) - This type of dressing wears off with time and does not require more dressings over the top unless it is draining around the glue as it wears off.  Do not apply ointments or lotions over the incisions until the glue has completely worn off.    Q:  There is a piece of tape or a sticky  lead  still on my skin.  Can I remove this?  A:  Sometimes the sticky  leads  used for monitoring during surgery or for evaluation in the emergency department are not all removed while you are in the hospital.  These sometimes have a tab or metal dot on them.  You can easily  remove these on your own, like taking off a band-aid.  If there is a gel substance under the  lead , simply wipe/clean it off with a washcloth or paper towel.      Q:  What can I do to minimize constipation (very hard stools, or lack of stools)?  A:  Stay well hydrated.  Increase your dietary fiber intake or take a fiber supplement -with plenty of water.  Walk around frequently.  You may consider an over-the-counter stool-softener.  Your Pharmacist can assist you with choosing one that is stocked at your pharmacy.  Constipation is also one of the most common side effects of pain medication.  If you are using pain medication, be pro-active and try to PREVENT problems with constipation by taking the steps above BEFORE constipation becomes a problem.    Q:  What do I do if I need more pain medications?  A:  Call the office to receive refills.  Be aware that certain pain meds cannot be called into a pharmacy and actually require a paper prescription.  A change may be made in your pain med as you progress thru your recovery period or if you have side effects to certain meds.    --Pain meds are NOT refilled after 5pm on weekdays, and NOT AT ALL on the weekends, so please look ahead to prevent problems.    Q:  Why am I having a hard time sleeping now that I am at home?  A:  Many medications you receive while you are in the hospital can impact your sleep for a number of days after your surgery/hospitalization.  Decreased level of activity and naps during the day may also make sleeping at night difficult.  Try to minimize day-time naps, and get up frequently during the day to walk around your home during your recovery time.  Sleep aides may be of some help, but are not recommended for long-term use.      Q:  I am having some back discomfort.  What should I do?  A:  This may be related to certain positioning that was required for your surgery, extended periods of time in bed, or other changes in your overall activity level.   You may try ice, heat, acetaminophen, or ibuprofen to treat this temporarily.  Note that many pain medications have acetaminophen in them and would state this on the prescription bottle.  Be sure not to exceed the maximum of 4000mg per day of acetaminophen.     **If the pain you are having does not resolve, is severe, or is a flare of back pain you have had on other occasions prior to surgery, please contact your primary physician for further recommendations or for an appointment to be examined at their office.    Q:  Why am I having headaches?  A:  Headaches can be caused by many things:  caffeine withdrawal, use of pain meds, dehydration, high blood pressure, lack of sleep, over-activity/exhaustion, flare-up of usual migraine headaches.  If you feel this is related to muscle tension (a band-like feeling around the head, or a pressure at the low-back of the head) you may try ice or heat to this area.  You may need to drink more fluids (try electrolyte drink like Gatorade), rest, or take your usual migraine medications.   **If your headaches do not resolve, worsen, are accompanied by other symptoms, or if your blood pressure is high, please call your primary physician for recommendation and/or examination.    Q:  I am unable to urinate.  What do I do?  A:  A small percentage of people can have difficulty urinating initially after surgery.  This includes being able to urinate only a very small amount at a time and feeling discomfort or pressure in the very low abdomen.  This is called  urinary retention , and is actually an urgent situation.  Proceed to your nearest Emergency department for evaluation (not an Urgent Care Center).  Sometimes the bladder does not work correctly after certain medications you receive during surgery, or related to certain procedures.  You may need to have a catheter placed until your bladder recovers.  When planning to go to an Emergency department, it may help to call the ER to let them  know you are coming in for this problem after a surgery.  This may help you get in quicker to be evaluated.  **If you have symptoms of a urinary tract infection, please contact your primary physician for the proper evaluation and treatment.        If you have other questions, please call the office Monday thru Friday between 8am and 4:30pm to discuss with the nurse or physician assistant.  #[phone]    There is a surgeon ON CALL on weekday evenings and over the weekend in case of urgent need only, and may be contacted at the same number.    If you are having an emergency, call 911 or proceed to your nearest emergency department.    GENERAL ANESTHESIA OR SEDATION ADULT DISCHARGE INSTRUCTIONS   SPECIAL PRECAUTIONS FOR 24 HOURS AFTER SURGERY    IT IS NOT UNUSUAL TO FEEL LIGHT-HEADED OR FAINT, UP TO 24 HOURS AFTER SURGERY OR WHILE TAKING PAIN MEDICATION.  IF YOU HAVE THESE SYMPTOMS; SIT FOR A FEW MINUTES BEFORE STANDING AND HAVE SOMEONE ASSIST YOU WHEN YOU GET UP TO WALK OR USE THE BATHROOM.    YOU SHOULD REST AND RELAX FOR THE NEXT 24 HOURS AND YOU MUST MAKE ARRANGEMENTS TO HAVE SOMEONE STAY WITH YOU FOR AT LEAST 24 HOURS AFTER YOUR DISCHARGE.  AVOID HAZARDOUS AND STRENUOUS ACTIVITIES.  DO NOT MAKE IMPORTANT DECISIONS FOR 24 HOURS.    DO NOT DRIVE ANY VEHICLE OR OPERATE MECHANICAL EQUIPMENT FOR 24 HOURS FOLLOWING THE END OF YOUR SURGERY.  EVEN THOUGH YOU MAY FEEL NORMAL, YOUR REACTIONS MAY BE AFFECTED BY THE MEDICATION YOU HAVE RECEIVED.    DO NOT DRINK ALCOHOLIC BEVERAGES FOR 24 HOURS FOLLOWING YOUR SURGERY.    DRINK CLEAR LIQUIDS (APPLE JUICE, GINGER ALE, 7-UP, BROTH, ETC.).  PROGRESS TO YOUR REGULAR DIET AS YOU FEEL ABLE.    YOU MAY HAVE A DRY MOUTH, A SORE THROAT, MUSCLES ACHES OR TROUBLE SLEEPING.  THESE SHOULD GO AWAY AFTER 24 HOURS.    CALL YOUR DOCTOR FOR ANY OF THE FOLLOWING:  SIGNS OF INFECTION (FEVER, GROWING TENDERNESS AT THE SURGERY SITE, A LARGE AMOUNT OF DRAINAGE OR BLEEDING, SEVERE PAIN,  FOUL-SMELLING DRAINAGE, REDNESS OR SWELLING.    IT HAS BEEN OVER 8 TO 10 HOURS SINCE SURGERY AND YOU ARE STILL NOT ABLE TO URINATE (PASS WATER).     Maximum acetaminophen (Tylenol) dose from all sources should not exceed 4 grams (4000 mg) per day.  Each Norco has 325 mg of Tylenol.

## 2022-02-28 NOTE — ANESTHESIA CARE TRANSFER NOTE
Patient: Michelle Grant    Procedure: Procedure(s):  LEFT BREAST BIOPSY, NIPPLE EXPLORATION, DUCT EXCISION       Diagnosis: Bloody discharge from left nipple [N64.52]  Diagnosis Additional Information: No value filed.    Anesthesia Type:   MAC     Note:    Oropharynx: oropharynx clear of all foreign objects  Level of Consciousness: awake  Oxygen Supplementation: room air    Independent Airway: airway patency satisfactory and stable  Dentition: dentition unchanged    Report to RN Given: handoff report given  Patient transferred to: PACU    Handoff Report: Identifed the Patient, Identified the Reponsible Provider, Reviewed the pertinent medical history, Discussed the surgical course, Reviewed Intra-OP anesthesia mangement and issues during anesthesia, Set expectations for post-procedure period and Allowed opportunity for questions and acknowledgement of understanding      Vitals:  Vitals Value Taken Time   /77 02/28/22 1413   Temp 97.8  F (36.6  C) 02/28/22 1413   Pulse 79 02/28/22 1413   Resp 18 02/28/22 1413   SpO2 95 % 02/28/22 1413       Electronically Signed By: NICKY Denis CRNA  February 28, 2022  2:15 PM

## 2022-02-28 NOTE — OP NOTE
PREOPERATIVE DIAGNOSIS: Left bloody nipple discharge.   POSTOPERATIVE DIAGNOSIS: Left bloody nipple discharge.   PROCEDURE: Left nipple exploration, duct excision.   ANESTHESIA: MAC.   PREOPERATIVE MEDICATIONS: Ancef 2 gm.   SURGEON: Yolande Virk MD   ASSISTANT: China Bajwa PA-C  - the physician assistant was medically necessary in providing adequate exposure in the operating field, maintaining hemostasis, cutting suture, clamping and ligating blood vessels, and visualization of the anatomic structures throughout the surgical procedure.   INDICATIONS: Michelle Grant  is a 57 year old female who has recent onset of a left  bloody nipple discharge. She has essentially negative imaging. There is a small crust of old blood on a single lateral duct with a tiny palpable mass associated with that side of the nipple. The patient presents today for nipple exploration, duct excision.   PROCEDURE: The patient was placed supine. Left breast prepped and draped in the usual sterile fashion. Local anesthetic was obtained with 1% lidocaine and 0.25% Marcaine plain. A circumareolar incision is made on the lateral aspect of the areola.   I elevated the areola and approached the nipple from the back side and found the small mass directly underneath the nipple propper..   I dissected dircumferentially around this small intraductal mass and excised that in its entirety, while keeping the nipple skin intact.  I submitted the duct in its entirety for permanent sections along with a small fat pad with potentially another small mass or cyst. The site was then inspected for hemostasis, irrigated and closed using 3-0 Vicryl subdermals and 4-0 subcuticular Monocryl. The patient was transferred to recovery in good condition.   ESTIMATED BLOOD LOSS: 2 cc.   INTRAOPERATIVE FINDINGS: small mass directly behind the nipple, excised, pathology pending.   YOLANDE VIRK MD

## 2022-02-28 NOTE — ANESTHESIA PREPROCEDURE EVALUATION
Anesthesia Pre-Procedure Evaluation    Patient: Michelle Grant   MRN: [id number] : 1965        Procedure : Procedure(s):  BIOPSY, BREAST, nipple exploration, duct excision          Past Medical History:   Diagnosis Date     Benign essential hypertension 2019     Malignant hyperthermia     brother     Morbid obesity (H) 2018      Past Surgical History:   Procedure Laterality Date     HC LAPAROSCOPY, SURGICAL; CHOLECYSTECTOMY  2003    Cholecystectomy, Laparoscopic     WISDOM TOOTH EXTRACTION       ZZC EXCIS UTERINE FIBROID,ABD APPRCH  1991     ZZC TOTAL ABDOM HYSTERECTOMY  2010    Hysterectomy ovaries left in      No Known Allergies   Social History     Tobacco Use     Smoking status: Never Smoker     Smokeless tobacco: Never Used   Substance Use Topics     Alcohol use: Yes     Alcohol/week: 0.8 standard drinks     Types: 1 Standard drinks or equivalent per week     Comment: 2-3 drnks per month      Wt Readings from Last 1 Encounters:   22 125.2 kg (276 lb)        Anesthesia Evaluation   Pt has had prior anesthetic. Type: General.    History of anesthetic complications  - malignant hyperthermia.  brother with episode of MH.    ROS/MED HX  ENT/Pulmonary:     (+) sleep apnea, moderate, uses CPAP, VAIBHAV risk factors, hypertension, obese,     Neurologic:  - neg neurologic ROS     Cardiovascular:     (+) hypertension-----    METS/Exercise Tolerance:     Hematologic:  - neg hematologic  ROS     Musculoskeletal:  - neg musculoskeletal ROS     GI/Hepatic:  - neg GI/hepatic ROS     Renal/Genitourinary:  - neg Renal ROS     Endo: Comment: Class 3 obesity    (+) Obesity,     Psychiatric/Substance Use:  - neg psychiatric ROS     Infectious Disease:  - neg infectious disease ROS     Malignancy:  - neg malignancy ROS     Other:  - neg other ROS          Physical Exam    Airway        Mallampati: II   TM distance: > 3 FB   Neck ROM: full   Mouth opening: > 3 cm    Respiratory Devices and  Support         Dental  no notable dental history         Cardiovascular   cardiovascular exam normal       Rhythm and rate: regular     Pulmonary   pulmonary exam normal        breath sounds clear to auscultation       Other findings: Lab Test        02/23/22 01/07/21 06/04/19                       0000          1549          0000          WBC          7.8           --          7.8           HGB          13.6         12.4         13.0          MCV          91.8          --          92.4          PLT          260           --          228            Lab Test        02/24/22 01/11/22 01/07/21                       0000          1051          0000          NA           141.7        140.8        141.6         POTASSIUM    4.72         4.05         4.75          CHLORIDE     105.8        107.0        106.5         CO2          28.1         26.3         29.8          BUN          18  17.8    21  23.1*   15  16.5     CR           1.01         0.91         0.91          JENNIFER          9.3          9.4          9.4           GLC          130*         107*         103*                 EKG Interpretation:     OUTSIDE LABS:  CBC:   Lab Results   Component Value Date    WBC 7.8 02/23/2022    WBC 7.8 06/04/2019    HGB 13.6 02/23/2022    HGB 12.4 01/07/2021    HCT 40.7 02/23/2022    HCT 40.6 06/04/2019     02/23/2022     06/04/2019     BMP:   Lab Results   Component Value Date    .7 02/24/2022    .8 01/11/2022    POTASSIUM 4.72 02/24/2022    POTASSIUM 4.05 01/11/2022    CHLORIDE 105.8 02/24/2022    CHLORIDE 107.0 01/11/2022    CO2 28.1 02/24/2022    CO2 26.3 01/11/2022    BUN 18 02/24/2022    BUN 17.8 02/24/2022    CR 1.01 02/24/2022    CR 0.91 01/11/2022     (A) 02/24/2022     (A) 01/11/2022     COAGS: No results found for: PTT, INR, FIBR  POC:   Lab Results   Component Value Date    HCG Negative 04/19/2010     HEPATIC:   Lab Results   Component Value Date    ALBUMIN 4.1  01/11/2022    PROTTOTAL 6.8 01/11/2022    ALKPHOS 78 01/11/2022    BILITOTAL 0.5 01/11/2022     OTHER:   Lab Results   Component Value Date    A1C 5.4 01/07/2015    JENNIFER 9.3 02/24/2022    TSH 1.25 06/04/2019       Anesthesia Plan    ASA Status:  3      Anesthesia Type: MAC.     - Reason for MAC: chronic cardiopulmonary disease, immobility needed, straight local not clinically adequate   Induction: Intravenous.   Maintenance: TIVA.        Consents    Anesthesia Plan(s) and associated risks, benefits, and realistic alternatives discussed. Questions answered and patient/representative(s) expressed understanding.     - Discussed: Risks, Benefits and Alternatives for BOTH SEDATION and the PROCEDURE were discussed     - Discussed with:  Patient      - Extended Intubation/Ventilatory Support Discussed: No.      - Patient is DNR/DNI Status: No    Use of blood products discussed: No .     Postoperative Care    Pain management: IV analgesics, Oral pain medications, Multi-modal analgesia, Peripheral nerve block (Single Shot).   PONV prophylaxis: Ondansetron (or other 5HT-3), Background Propofol Infusion     Comments:                Gwyn Prado MD

## 2022-02-28 NOTE — ANESTHESIA POSTPROCEDURE EVALUATION
Patient: Michelle Grant    Procedure: Procedure(s):  LEFT BREAST BIOPSY, NIPPLE EXPLORATION, DUCT EXCISION       Anesthesia Type:  MAC    Note:  Disposition: Outpatient   Postop Pain Control: Uneventful            Sign Out: Well controlled pain   PONV: No   Neuro/Psych: Uneventful            Sign Out: Acceptable/Baseline neuro status   Airway/Respiratory: Uneventful            Sign Out: Acceptable/Baseline resp. status   CV/Hemodynamics: Uneventful            Sign Out: Acceptable CV status; No obvious hypovolemia; No obvious fluid overload   Other NRE: NONE   DID A NON-ROUTINE EVENT OCCUR? No           Last vitals:  Vitals Value Taken Time   /77 02/28/22 1413   Temp 97.8  F (36.6  C) 02/28/22 1413   Pulse 79 02/28/22 1413   Resp 18 02/28/22 1413   SpO2 95 % 02/28/22 1413       Electronically Signed By: Gwyn Prado MD  February 28, 2022  2:58 PM

## 2022-03-02 LAB
PATH REPORT.COMMENTS IMP SPEC: NORMAL
PATH REPORT.COMMENTS IMP SPEC: NORMAL
PATH REPORT.FINAL DX SPEC: NORMAL
PATH REPORT.GROSS SPEC: NORMAL
PATH REPORT.MICROSCOPIC SPEC OTHER STN: NORMAL
PATH REPORT.MICROSCOPIC SPEC OTHER STN: NORMAL
PATH REPORT.RELEVANT HX SPEC: NORMAL
PHOTO IMAGE: NORMAL

## 2022-10-04 ENCOUNTER — MYC MEDICAL ADVICE (OUTPATIENT)
Dept: FAMILY MEDICINE | Facility: CLINIC | Age: 57
End: 2022-10-04

## 2022-10-06 ENCOUNTER — OFFICE VISIT (OUTPATIENT)
Dept: FAMILY MEDICINE | Facility: CLINIC | Age: 57
End: 2022-10-06

## 2022-10-06 VITALS
DIASTOLIC BLOOD PRESSURE: 82 MMHG | SYSTOLIC BLOOD PRESSURE: 140 MMHG | OXYGEN SATURATION: 98 % | TEMPERATURE: 98.2 F | HEART RATE: 77 BPM

## 2022-10-06 DIAGNOSIS — R94.31 ABNORMAL ELECTROCARDIOGRAM: ICD-10-CM

## 2022-10-06 DIAGNOSIS — R00.2 PALPITATIONS: Primary | ICD-10-CM

## 2022-10-06 LAB
% GRANULOCYTES: 67.1 %
BUN SERPL-MCNC: 18 MG/DL (ref 7–25)
BUN/CREATININE RATIO: 18.2 (ref 6–22)
CALCIUM SERPL-MCNC: 9.1 MG/DL (ref 8.6–10.3)
CHLORIDE SERPLBLD-SCNC: 107.1 MMOL/L (ref 98–110)
CO2 SERPL-SCNC: 25 MMOL/L (ref 20–32)
CREAT SERPL-MCNC: 0.99 MG/DL (ref 0.6–1.3)
GLUCOSE SERPL-MCNC: 102 MG/DL (ref 60–99)
HCT VFR BLD AUTO: 40.2 % (ref 35–47)
HEMOGLOBIN: 13.4 G/DL (ref 11.7–15.7)
LYMPHOCYTES NFR BLD AUTO: 26.1 %
MCH RBC QN AUTO: 30.7 PG (ref 26–33)
MCHC RBC AUTO-ENTMCNC: 33.3 G/DL (ref 31–36)
MCV RBC AUTO: 92.2 FL (ref 78–100)
MONOCYTES NFR BLD AUTO: 6.8 %
PLATELET COUNT - QUEST: 254 10^9/L (ref 150–375)
POTASSIUM SERPL-SCNC: 3.76 MMOL/L (ref 3.5–5.3)
RBC # BLD AUTO: 4.36 10*12/L (ref 3.8–5.2)
SODIUM SERPL-SCNC: 143.5 MMOL/L (ref 135–146)
WBC # BLD AUTO: 7.4 10*9/L (ref 4–11)

## 2022-10-06 PROCEDURE — 99214 OFFICE O/P EST MOD 30 MIN: CPT | Mod: 25 | Performed by: FAMILY MEDICINE

## 2022-10-06 PROCEDURE — 85025 COMPLETE CBC W/AUTO DIFF WBC: CPT | Performed by: FAMILY MEDICINE

## 2022-10-06 PROCEDURE — 93000 ELECTROCARDIOGRAM COMPLETE: CPT | Performed by: FAMILY MEDICINE

## 2022-10-06 PROCEDURE — 84443 ASSAY THYROID STIM HORMONE: CPT | Mod: 90 | Performed by: FAMILY MEDICINE

## 2022-10-06 PROCEDURE — 80048 BASIC METABOLIC PNL TOTAL CA: CPT | Performed by: FAMILY MEDICINE

## 2022-10-06 NOTE — NURSING NOTE
Chief Complaint   Patient presents with     Heart Problem     Heart flutters occurring at night, thinks it started after her 3rd covid booster 09//26, fluttering is waking her up at night      Derm Problem     Eczema flare up on her left shin      Pre-visit Screening:  Immunizations:  not up to date - shingrix at pharmacy  Colonoscopy:  is due  Mammogram: is up to date  Asthma Action Test/Plan:  NA  PHQ9:  NA  GAD7:  NA  Questioned patient about current smoking habits Pt. has never smoked.  Ok to leave detailed message on voice mail for today's visit only Yes, phone # [phone]

## 2022-10-06 NOTE — PROGRESS NOTES
"Assessment & Plan   Problem List Items Addressed This Visit    None     Visit Diagnoses     Palpitations    -  Primary    Relevant Orders    EKG 12-lead complete w/read - Clinics (Completed)    HEMOGRAM PLATELET DIFF ([facility]) (Completed)    Basic Metabolic Panel ([facility])    TSH WITH FREE T4 REFLEX ([facility])    Adult Leadless EKG Monitor 3 to 7 Days    Echocardiogram Complete    Abnormal electrocardiogram        Relevant Orders    Echocardiogram Complete           1. Palpitations  ekg done, referral for echocardiogram, labs and zio patch. If she again has symptoms that do not quickly resolve, or has associated chest pain or shortness of breath, go to the Er.  - EKG 12-lead complete w/read - Clinics  - HEMOGRAM PLATELET DIFF ([facility])  - Basic Metabolic Panel ([facility])  - TSH WITH FREE T4 REFLEX ([facility])  - Adult Leadless EKG Monitor 3 to 7 Days; Future  - Echocardiogram Complete; Future    2. Abnormal electrocardiogram  Referral for echo.  - Echocardiogram Complete; Future         BMI:   Estimated body mass index is 41.97 kg/m  as calculated from the following:    Height as of 2/28/22: 1.727 m (5' 8\").    Weight as of 2/28/22: 125.2 kg (276 lb).         FUTURE APPOINTMENTS:       - Follow-up visit after results and testing are complete.    No follow-ups on file.    Unique Jensen MD  [facility]    Subjective     Nursing Notes:   Faina Posadas CMA  10/6/2022  4:58 PM  Signed  Chief Complaint   Patient presents with     Heart Problem     Heart flutters occurring at night, thinks it started after her 3rd covid booster 09//26, fluttering is waking her up at night      Derm Problem     Eczema flare up on her left shin      Pre-visit Screening:  Immunizations:  not up to date - shingrix at pharmacy  Colonoscopy:  is due  Mammogram: is up to date  Asthma Action Test/Plan:  NA  PHQ9:  NA  GAD7:  NA  Questioned patient about current smoking habits Pt. has never smoked.  Ok to leave detailed message on voice mail for " today's visit only Yes, phone # [phone]           Michelle Grant is a 57 year old female who presents to clinic today for the following health issues   HPI     Heart fluttering mostly at night, started since she got her 3rd covid booster last Monday.  Only at night. Wakes her up. Goes away when she turns to the other side or lying on her back. It feels like it's quivering or fluttering. No chest pain, no shortness of breath. Has had a little lightheadedness a couple of mornings when she gets up but not when it's happening. Was at water aerobics last night and felt ok. Did some walking and didn't notice it. No chest pain with this.  No history heart problems and hasn't had this evaluated in the past.  Mom with a fib and dad with heart stents.  Alcohol use: rarely. Caffeine: not much since she had covid. Not daily, couple times per week. Make a mvi and metamucil but no other supplements or otc. No drug use.    Also has an eczema rash on right lower anterior leg, seems to get worse, scratches and rubs it, also thinks the chlorine pool irritates it. Has seen dermatology in the past for eczema.        Review of Systems   Constitutional, HEENT, cardiovascular, pulmonary, gi and gu systems are negative, except as otherwise noted.      Objective    BP (!) 140/82 (BP Location: Right arm, Patient Position: Sitting, Cuff Size: Adult Large)   Pulse 77   Temp 98.2  F (36.8  C) (Temporal)   SpO2 98%   There is no height or weight on file to calculate BMI.  Physical Exam   GENERAL: healthy, alert and no distress  NECK: no adenopathy, no asymmetry, masses, or scars and thyroid normal to palpation  RESP: lungs clear to auscultation - no rales, rhonchi or wheezes  CV: regular rate and rhythm, normal S1 S2, no S3 or S4, no murmur, click or rub, no peripheral edema and peripheral pulses strong  MS: no gross musculoskeletal defects noted, no edema  NEURO: Normal strength and tone, mentation intact and speech normal  PSYCH:  mentation appears normal, affect normal/bright  Macular patch left anterior lower leg, red and slightly scaly,large flat    Results for orders placed or performed in visit on 10/06/22   HEMOGRAM PLATELET DIFF (BFP)     Status: None   Result Value Ref Range    WBC 7.4 4.0 - 11 10*9/L    RBC Count 4.36 3.8 - 5.2 10*12/L    Hemoglobin 13.4 11.7 - 15.7 g/dL    Hematocrit 40.2 35.0 - 47.0 %    MCV 92.2 78 - 100 fL    MCH 30.7 26 - 33 pg    MCHC 33.3 31 - 36 g/dL    Platelet Count 254 150 - 375 10^9/L    % Granulocytes 67.1 %    % Lymphocytes 26.1 %    % Monocytes 6.8 %       EKG: done, sinus rhythm

## 2022-10-08 LAB — TSH SERPL-ACNC: 1.24 MIU/L (ref 0.4–4.5)

## 2022-10-09 ENCOUNTER — HEALTH MAINTENANCE LETTER (OUTPATIENT)
Age: 57
End: 2022-10-09

## 2022-10-18 ENCOUNTER — HOSPITAL ENCOUNTER (OUTPATIENT)
Dept: CARDIOLOGY | Facility: CLINIC | Age: 57
Discharge: HOME | End: 2022-10-18
Attending: FAMILY MEDICINE
Payer: COMMERCIAL

## 2022-10-18 DIAGNOSIS — R00.2 PALPITATIONS: ICD-10-CM

## 2022-10-18 DIAGNOSIS — R94.31 ABNORMAL ELECTROCARDIOGRAM: ICD-10-CM

## 2022-10-18 PROCEDURE — 93242 EXT ECG>48HR<7D RECORDING: CPT

## 2022-10-18 PROCEDURE — 93244 EXT ECG>48HR<7D REV&INTERPJ: CPT | Performed by: INTERNAL MEDICINE

## 2022-10-18 PROCEDURE — 93306 TTE W/DOPPLER COMPLETE: CPT

## 2022-10-18 PROCEDURE — 93306 TTE W/DOPPLER COMPLETE: CPT | Mod: 26 | Performed by: INTERNAL MEDICINE

## 2022-11-10 ENCOUNTER — OFFICE VISIT (OUTPATIENT)
Dept: CARDIOLOGY | Facility: CLINIC | Age: 57
End: 2022-11-10
Attending: FAMILY MEDICINE
Payer: COMMERCIAL

## 2022-11-10 VITALS
HEIGHT: 69 IN | HEART RATE: 81 BPM | SYSTOLIC BLOOD PRESSURE: 144 MMHG | WEIGHT: 278.2 LBS | DIASTOLIC BLOOD PRESSURE: 82 MMHG | OXYGEN SATURATION: 97 % | BODY MASS INDEX: 41.2 KG/M2

## 2022-11-10 DIAGNOSIS — R94.31 ABNORMAL ELECTROCARDIOGRAM: ICD-10-CM

## 2022-11-10 DIAGNOSIS — I47.10 SVT (SUPRAVENTRICULAR TACHYCARDIA) (H): Primary | ICD-10-CM

## 2022-11-10 DIAGNOSIS — R00.2 PALPITATIONS: ICD-10-CM

## 2022-11-10 DIAGNOSIS — E66.01 CLASS 3 SEVERE OBESITY DUE TO EXCESS CALORIES WITHOUT SERIOUS COMORBIDITY WITH BODY MASS INDEX (BMI) OF 40.0 TO 44.9 IN ADULT (H): ICD-10-CM

## 2022-11-10 DIAGNOSIS — E66.813 CLASS 3 SEVERE OBESITY DUE TO EXCESS CALORIES WITHOUT SERIOUS COMORBIDITY WITH BODY MASS INDEX (BMI) OF 40.0 TO 44.9 IN ADULT (H): ICD-10-CM

## 2022-11-10 PROCEDURE — 99204 OFFICE O/P NEW MOD 45 MIN: CPT | Performed by: INTERNAL MEDICINE

## 2022-11-10 RX ORDER — BIOTIN 10 MG
TABLET ORAL DAILY
COMMUNITY

## 2022-11-10 NOTE — LETTER
11/10/2022    Unique Jensen MD  [address] [facility] [address]    RE: Michelle Grant       Dear Colleague,     I had the pleasure of seeing Michelle Grant in the [facility].  CARDIOLOGY CLINIC CONSULTATION      REASON FOR CONSULT:   Palpitations    PRIMARY CARE PHYSICIAN:  Unique Jensen        History of Present Illness   Michelle Grant is an extremely pleasant 57 year old female here as a new patient to discuss her palpitations.  Her past medical history is significant only for morbid obesity, long COVID, and hypertension.  Her family history is significant for her father having stents in his 60s, her mother having A. fib and her 70s, and her maternal grandfather apparently having sudden cardiac death of unexplained cause in his 60s.  Patient is a never smoker, and only occasionally drinks alcohol.  She tries to do occasional exercise with water aerobics and walking.    Her symptoms started primarily after getting a COVID booster in late September 2022.  She notes that she had multiple episodes early on in the evenings that would occasionally wake her up from sleep of racing heartbeat.  These did not last particularly long, usually well under a minute, and there is no associated syncope or near syncope, but they were still bothersome.  Eventually, these became less frequent and less severe.  She did have work-up for this including a TTE which I personally reviewed on 10/18/2022 which had adequate but not excellent images and overall was roughly normal except I would agree that her right ventricle did look a little bit larger than normal.  She also had a 7-day Zio patch in October 2022 which showed only 4 episodes of SVT, the longest of which was 6 beats, with no other worrisome dysrhythmias.    Her most recent labs are from 10/6/2022, showing normal sodium and potassium, creatinine 0.99, TSH of 1.24, and hemoglobin of 13.4.  Her most recent lipids from January 2022 showed a  total cholesterol of 155, HDL 47, triglycerides 117, and LDL 85.      Assessment & Plan     1. Palpitations, likely benign SVT, possibly related to COVID-vaccine related myocardial inflammation, now improving and nearly resolved  2. Possible mild RV enlargement on TTE (with suboptimal windows)  3. Morbid obesity  4. Hypertension  5. Long COVID  6. History of CAD      It was a pleasure to meet with Michelle in clinic today.  We discussed the potential causes of her palpitations, and I think that it is possible that she had some degree of myocardial inflammation following her recent COVID booster which may have triggered some SVT episodes.  Thankfully, these appear to be significantly improving and nearly resolved, and she had no worrisome dysrhythmias on her Zio patch.  In addition, her echocardiogram was probably normal, though there was some suggestion in some views of some mild RV enlargement.  I would suspect that this is just due to the asymmetric nature of the RV, but I would recommend a repeat TTE in 1 year just to follow this, or sooner if new symptoms occur.  Otherwise, the main issue that she is dealing with is her weight, and she does state that she would like to look into other options for losing weight.  I recommended the bariatric clinic, and she is open to this as well.  We will place a referral.  Her blood pressure was somewhat above goal today, but we will hold off on make any changes here for now as she works on lifestyle interventions for weight which may also hopefully improve her blood pressure.        -Reassurance given for likely benign and resolving palpitations  -Repeat TTE in 1 year to follow possible mild RV enlargement  -Referral placed for bariatric clinic  -Continue lisinopril 20 mg daily      Follow-up: 1 year, with TTE and lipid panel beforehand          Aj Faajrdo MD  Interventional Cardiology  November 10, 2022        Medications   Current Outpatient Medications   Medication      lisinopril (ZESTRIL) 20 MG tablet     Multiple Vitamins-Minerals (MULTIVITAMIN ADULT) CHEW     No current facility-administered medications for this visit.     Allergies   No Known Allergies      Physical Exam       BP: (!) 144/82 Pulse: 81     SpO2: 97 %      Vital Signs with Ranges  Pulse:  [81] 81  BP: (144)/(82) 144/82  SpO2:  [97 %] 97 %  278 lbs 3.2 oz    Constitutional: Well-appearing, no acute distress  Respiratory: Normal respiratory effort, CTAB  Cardiovascular: RRR, no m/r/g.  JVP < 7 cm H2O.  There is no LE edema.  Normal carotid upstrokes, no carotid bruits.      Thank you for allowing me to participate in the care of your patient.      Sincerely,     Aj Fajardo MD     [facility]  cc:   Unique Jensen MD  [address] [place] [address]

## 2022-11-10 NOTE — PROGRESS NOTES
CARDIOLOGY CLINIC CONSULTATION      REASON FOR CONSULT:   Palpitations    PRIMARY CARE PHYSICIAN:  Unique Jensen        History of Present Illness   Michelle Grant is an extremely pleasant 57 year old female here as a new patient to discuss her palpitations.  Her past medical history is significant only for morbid obesity, long COVID, and hypertension.  Her family history is significant for her father having stents in his 60s, her mother having A. fib and her 70s, and her maternal grandfather apparently having sudden cardiac death of unexplained cause in his 60s.  Patient is a never smoker, and only occasionally drinks alcohol.  She tries to do occasional exercise with water aerobics and walking.    Her symptoms started primarily after getting a COVID booster in late September 2022.  She notes that she had multiple episodes early on in the evenings that would occasionally wake her up from sleep of racing heartbeat.  These did not last particularly long, usually well under a minute, and there is no associated syncope or near syncope, but they were still bothersome.  Eventually, these became less frequent and less severe.  She did have work-up for this including a TTE which I personally reviewed on 10/18/2022 which had adequate but not excellent images and overall was roughly normal except I would agree that her right ventricle did look a little bit larger than normal.  She also had a 7-day Zio patch in October 2022 which showed only 4 episodes of SVT, the longest of which was 6 beats, with no other worrisome dysrhythmias.    Her most recent labs are from 10/6/2022, showing normal sodium and potassium, creatinine 0.99, TSH of 1.24, and hemoglobin of 13.4.  Her most recent lipids from January 2022 showed a total cholesterol of 155, HDL 47, triglycerides 117, and LDL 85.      Assessment & Plan     1. Palpitations, likely benign SVT, possibly related to COVID-vaccine related myocardial inflammation, now improving and  nearly resolved  2. Possible mild RV enlargement on TTE (with suboptimal windows)  3. Morbid obesity  4. Hypertension  5. Long COVID  6. History of CAD      It was a pleasure to meet with Michelle in clinic today.  We discussed the potential causes of her palpitations, and I think that it is possible that she had some degree of myocardial inflammation following her recent COVID booster which may have triggered some SVT episodes.  Thankfully, these appear to be significantly improving and nearly resolved, and she had no worrisome dysrhythmias on her Zio patch.  In addition, her echocardiogram was probably normal, though there was some suggestion in some views of some mild RV enlargement.  I would suspect that this is just due to the asymmetric nature of the RV, but I would recommend a repeat TTE in 1 year just to follow this, or sooner if new symptoms occur.  Otherwise, the main issue that she is dealing with is her weight, and she does state that she would like to look into other options for losing weight.  I recommended the bariatric clinic, and she is open to this as well.  We will place a referral.  Her blood pressure was somewhat above goal today, but we will hold off on make any changes here for now as she works on lifestyle interventions for weight which may also hopefully improve her blood pressure.        -Reassurance given for likely benign and resolving palpitations  -Repeat TTE in 1 year to follow possible mild RV enlargement  -Referral placed for bariatric clinic  -Continue lisinopril 20 mg daily      Follow-up: 1 year, with TTE and lipid panel beforehand          Aj Fajardo MD  Interventional Cardiology  November 10, 2022        Medications   Current Outpatient Medications   Medication     lisinopril (ZESTRIL) 20 MG tablet     Multiple Vitamins-Minerals (MULTIVITAMIN ADULT) CHEW     No current facility-administered medications for this visit.     Allergies   No Known Allergies      Physical Exam        BP: (!) 144/82 Pulse: 81     SpO2: 97 %      Vital Signs with Ranges  Pulse:  [81] 81  BP: (144)/(82) 144/82  SpO2:  [97 %] 97 %  278 lbs 3.2 oz    Constitutional: Well-appearing, no acute distress  Respiratory: Normal respiratory effort, CTAB  Cardiovascular: RRR, no m/r/g.  JVP < 7 cm H2O.  There is no LE edema.  Normal carotid upstrokes, no carotid bruits.

## 2023-01-10 ENCOUNTER — OFFICE VISIT (OUTPATIENT)
Dept: FAMILY MEDICINE | Facility: CLINIC | Age: 58
End: 2023-01-10

## 2023-01-10 VITALS
SYSTOLIC BLOOD PRESSURE: 134 MMHG | HEIGHT: 69 IN | TEMPERATURE: 97.9 F | DIASTOLIC BLOOD PRESSURE: 82 MMHG | BODY MASS INDEX: 41.32 KG/M2 | OXYGEN SATURATION: 97 % | WEIGHT: 279 LBS | HEART RATE: 72 BPM

## 2023-01-10 DIAGNOSIS — I10 BENIGN ESSENTIAL HYPERTENSION: ICD-10-CM

## 2023-01-10 DIAGNOSIS — R19.5 POSITIVE COLORECTAL CANCER SCREENING USING COLOGUARD TEST: ICD-10-CM

## 2023-01-10 DIAGNOSIS — Z12.11 SCREEN FOR COLON CANCER: Primary | ICD-10-CM

## 2023-01-10 LAB
BUN SERPL-MCNC: 16 MG/DL (ref 7–25)
BUN/CREATININE RATIO: 16.8 (ref 6–22)
CALCIUM SERPL-MCNC: 9.2 MG/DL (ref 8.6–10.3)
CHLORIDE SERPLBLD-SCNC: 107.5 MMOL/L (ref 98–110)
CO2 SERPL-SCNC: 26.5 MMOL/L (ref 20–32)
CREAT SERPL-MCNC: 0.95 MG/DL (ref 0.6–1.3)
GLUCOSE SERPL-MCNC: 98 MG/DL (ref 60–99)
POTASSIUM SERPL-SCNC: 4.2 MMOL/L (ref 3.5–5.3)
SODIUM SERPL-SCNC: 142.3 MMOL/L (ref 135–146)

## 2023-01-10 PROCEDURE — 80048 BASIC METABOLIC PNL TOTAL CA: CPT | Performed by: FAMILY MEDICINE

## 2023-01-10 PROCEDURE — 36415 COLL VENOUS BLD VENIPUNCTURE: CPT | Performed by: FAMILY MEDICINE

## 2023-01-10 PROCEDURE — 99214 OFFICE O/P EST MOD 30 MIN: CPT | Performed by: FAMILY MEDICINE

## 2023-01-10 RX ORDER — LISINOPRIL 20 MG/1
20 TABLET ORAL DAILY
Qty: 90 TABLET | Refills: 3 | Status: CANCELLED | OUTPATIENT
Start: 2023-01-10

## 2023-01-10 RX ORDER — LISINOPRIL AND HYDROCHLOROTHIAZIDE 20; 25 MG/1; MG/1
1 TABLET ORAL DAILY
Qty: 90 TABLET | Refills: 1 | Status: SHIPPED | OUTPATIENT
Start: 2023-01-10 | End: 2023-07-12

## 2023-01-10 NOTE — NURSING NOTE
Chief Complaint   Patient presents with     Recheck Medication     Recheck BP, refill medications      Pre-visit Screening:  Immunizations:  up to date- will get at her pharmacy  Colonoscopy:  Pt declines  Mammogram: is due and to be scheduled by patient for later completion  Asthma Action Test/Plan:  NA  PHQ9:  NA  GAD7:  IRINEO  Questioned patient about current smoking habits Pt. has never smoked.  Ok to leave detailed message on voice mail for today's visit only Yes, phone # [phone]

## 2023-01-10 NOTE — PROGRESS NOTES
"Assessment & Plan   Problem List Items Addressed This Visit        Circulatory    Benign essential hypertension    Relevant Medications    lisinopril-hydrochlorothiazide (ZESTORETIC) 20-25 MG tablet    Other Relevant Orders    VENOUS COLLECTION (Completed)    Basic Metabolic Panel (BFP)   Other Visit Diagnoses     Screen for colon cancer    -  Primary    Relevant Orders    COLOGUARD([employer])         1. Benign essential hypertension  Blood pressures are slightly high at home and here. Add hydrochlorothiazide to her medication, watch at home. Goal is less than 130/80. Recheck here in 6 months, come in sooner if high.  - VENOUS COLLECTION  - Basic Metabolic Panel (BFP)  - lisinopril-hydrochlorothiazide (ZESTORETIC) 20-25 MG tablet; Take 1 tablet by mouth daily  Dispense: 90 tablet; Refill: 1    2. Screen for colon cancer    - COLOGUARD([employer]ES); Future             BMI:   Estimated body mass index is 41.8 kg/m  as calculated from the following:    Height as of this encounter: 1.74 m (5' 8.5\").    Weight as of this encounter: 126.6 kg (279 lb).         FUTURE APPOINTMENTS:       - Follow-up visit in 6 months.    No follow-ups on file.    Unique Jensen MD  [facility]    Subjective     Nursing Notes:   Faina Posadas CMA  1/10/2023 12:00 PM  Signed  Chief Complaint   Patient presents with     Recheck Medication     Recheck BP, refill medications      Pre-visit Screening:  Immunizations:  up to date- will get at her pharmacy  Colonoscopy:  Pt declines  Mammogram: is due and to be scheduled by patient for later completion  Asthma Action Test/Plan:  NA  PHQ9:  NA  GAD7:  NA  Questioned patient about current smoking habits Pt. has never smoked.  Ok to leave detailed message on voice mail for today's visit only Yes, phone # [phone]           Michelle Grant is a 57 year old female who presents to clinic today for the following health issues     HPI     Here to follwoup on her blood " "pressure. She is doing well on medications. Taking lisinopril. Has never been on hydrochlorothiazide. She said at home, blood pressures run a little high.        Review of Systems   Constitutional, HEENT, cardiovascular, pulmonary, gi and gu systems are negative, except as otherwise noted.      Objective    /82 (BP Location: Left arm, Patient Position: Sitting, Cuff Size: Adult Large)   Pulse 72   Temp 97.9  F (36.6  C) (Temporal)   Ht 1.74 m (5' 8.5\")   Wt 126.6 kg (279 lb)   SpO2 97%   BMI 41.80 kg/m    Body mass index is 41.8 kg/m .  Physical Exam   GENERAL: healthy, alert and no distress  RESP: lungs clear to auscultation - no rales, rhonchi or wheezes  CV: regular rate and rhythm, normal S1 S2, no S3 or S4, no murmur, click or rub, no peripheral edema and peripheral pulses strong  MS: no gross musculoskeletal defects noted, no edema  NEURO: Normal strength and tone, mentation intact and speech normal  PSYCH: mentation appears normal, affect normal/bright    No results found for any visits on 01/10/23.      "

## 2023-01-11 DIAGNOSIS — Z12.11 SCREEN FOR COLON CANCER: ICD-10-CM

## 2023-01-27 LAB — NONINV COLON CA DNA+OCC BLD SCRN STL QL: POSITIVE

## 2023-01-30 ENCOUNTER — TELEPHONE (OUTPATIENT)
Dept: FAMILY MEDICINE | Facility: CLINIC | Age: 58
End: 2023-01-30

## 2023-01-30 DIAGNOSIS — R19.5 POSITIVE COLORECTAL CANCER SCREENING USING DNA-BASED STOOL TEST: Primary | ICD-10-CM

## 2023-01-30 NOTE — TELEPHONE ENCOUNTER
Pt called back stating that she would like to move forward with a referral to [facility] for diagnostic colonoscopy.  Pt had a positive cologuard result.  Routing to Dr. Jensen thanks.

## 2023-03-25 ENCOUNTER — HEALTH MAINTENANCE LETTER (OUTPATIENT)
Age: 58
End: 2023-03-25

## 2023-04-04 ENCOUNTER — DOCUMENTATION ONLY (OUTPATIENT)
Dept: GASTROENTEROLOGY | Facility: CLINIC | Age: 58
End: 2023-04-04
Payer: COMMERCIAL

## 2023-04-04 NOTE — PROGRESS NOTES
Colorectal Cancer Screening Results  External result received from: [facility]   Date of Procedure: 3/27/2023  Health Maintenance updated based on provider recommendations/ASGE Guidelines.      Radha Boyd RN on 4/4/2023 at 2:57 PM

## 2023-04-14 ENCOUNTER — MYC MEDICAL ADVICE (OUTPATIENT)
Dept: FAMILY MEDICINE | Facility: CLINIC | Age: 58
End: 2023-04-14

## 2023-04-25 ENCOUNTER — HOSPITAL ENCOUNTER (OUTPATIENT)
Dept: MAMMOGRAPHY | Facility: CLINIC | Age: 58
Discharge: HOME | End: 2023-04-25
Attending: FAMILY MEDICINE | Admitting: FAMILY MEDICINE
Payer: COMMERCIAL

## 2023-04-25 DIAGNOSIS — Z12.31 VISIT FOR SCREENING MAMMOGRAM: ICD-10-CM

## 2023-04-25 PROCEDURE — 77067 SCR MAMMO BI INCL CAD: CPT

## 2023-10-11 NOTE — PROGRESS NOTES
SUBJECTIVE:   CC: Michelle is an 58 year old who presents for preventive health visit.     HPI    Here for a physical.    Here for a physical.  Is doing well on her blood pressure meds. Due for fasting labs and refills.  Also has a rash on her hand, small spot, large area on the right lower leg. Has been told in the past that she has eczema. Has tried hydrocortisone cream on it. Doesn't want a referral to derm.            Social History     Tobacco Use    Smoking status: Never    Smokeless tobacco: Never   Substance Use Topics    Alcohol use: Yes     Alcohol/week: 0.8 standard drinks of alcohol     Types: 1 Standard drinks or equivalent per week     Comment: 2-3 drnks per month              No data to display            No concerns  Reviewed orders with patient.  Reviewed health maintenance and updated orders accordingly - Yes  BP Readings from Last 3 Encounters:   10/31/23 134/80   01/10/23 134/82   11/10/22 (!) 144/82    Wt Readings from Last 3 Encounters:   10/31/23 124.7 kg (275 lb)   01/10/23 126.6 kg (279 lb)   11/10/22 126.2 kg (278 lb 3.2 oz)                  Patient Active Problem List   Diagnosis    Health Care Home    ACP (advance care planning)    Morbid obesity (H)    Benign essential hypertension    Migraine syndrome    Family history of malignant hyperthermia     Past Surgical History:   Procedure Laterality Date    BIOPSY BREAST Left 2/28/2022    Procedure: LEFT BREAST BIOPSY, NIPPLE EXPLORATION, DUCT EXCISION;  Surgeon: Yolande Bello MD;  Location:  OR     LAPAROSCOPY, SURGICAL; CHOLECYSTECTOMY  01/01/2003    Cholecystectomy, Laparoscopic    WISDOM TOOTH EXTRACTION      ZZC EXCIS UTERINE FIBROID,ABD APPRCH  01/01/1991    ZZC TOTAL ABDOM HYSTERECTOMY  01/01/2010    Hysterectomy ovaries left in       Social History     Tobacco Use    Smoking status: Never     Passive exposure: Never    Smokeless tobacco: Never   Substance Use Topics    Alcohol use: Yes     Alcohol/week: 0.8 standard drinks of  alcohol     Types: 1 Standard drinks or equivalent per week     Comment: 2-3 drnks per month     Family History   Problem Relation Age of Onset    Gastrointestinal Disease Mother     Hypertension Mother     Lipids Mother     C.A.D. Father     Hypertension Father     Lipids Father     Neurologic Disorder Father         memory issues unknown etiology    Malignant Hyperthermia Brother     Cancer - colorectal Other          Current Outpatient Medications   Medication Sig Dispense Refill    lisinopril-hydrochlorothiazide (ZESTORETIC) 20-25 MG tablet Take 1 tablet by mouth daily 90 tablet 1    Multiple Vitamins-Minerals (MULTIVITAMIN ADULT) CHEW Take by mouth daily         Breast Cancer Screening:  Any new diagnosis of family breast, ovarian, or bowel cancer? No    FHS-7:       1/13/2022     8:25 AM 4/25/2023     2:29 PM   Breast CA Risk Assessment (FHS-7)   Did any of your first-degree relatives have breast or ovarian cancer? No No   Did any of your relatives have bilateral breast cancer? No No   Did any man in your family have breast cancer? No No   Did any woman in your family have breast and ovarian cancer? No No   Did any woman in your family have breast cancer before age 50 y? No No   Do you have 2 or more relatives with breast and/or ovarian cancer? No No   Do you have 2 or more relatives with breast and/or bowel cancer? No No     click delete button to remove this line now  Mammogram up to date  Pertinent mammograms are reviewed under the imaging tab.    History of abnormal Pap smear: hysterectomy     Reviewed and updated as needed this visit by clinical staff                  Reviewed and updated as needed this visit by Provider                 Past Medical History:   Diagnosis Date    Benign essential hypertension 06/04/2019    Malignant hyperthermia     brother    Morbid obesity (H) 07/30/2018      Past Surgical History:   Procedure Laterality Date    BIOPSY BREAST Left 2/28/2022    Procedure: LEFT BREAST  BIOPSY, NIPPLE EXPLORATION, DUCT EXCISION;  Surgeon: Yolande Bello MD;  Location: [facility] OR    HC LAPAROSCOPY, SURGICAL; CHOLECYSTECTOMY  01/01/2003    Cholecystectomy, Laparoscopic    WISDOM TOOTH EXTRACTION      ZZC EXCIS UTERINE FIBROID,ABD APPRCH  01/01/1991    ZZC TOTAL ABDOM HYSTERECTOMY  01/01/2010    Hysterectomy ovaries left in       Review of Systems  CONSTITUTIONAL: NEGATIVE for fever, chills, change in weight  INTEGUMENTARY/SKIN: NEGATIVE for worrisome rashes, moles or lesions  EYES: NEGATIVE for vision changes or irritation  ENT: NEGATIVE for ear, mouth and throat problems  RESP: NEGATIVE for significant cough or SOB  BREAST: NEGATIVE for masses, tenderness or discharge  CV: NEGATIVE for chest pain, palpitations or peripheral edema  GI: NEGATIVE for nausea, abdominal pain, heartburn, or change in bowel habits  : NEGATIVE for unusual urinary or vaginal symptoms. No vaginal bleeding.  MUSCULOSKELETAL: NEGATIVE for significant arthralgias or myalgia  NEURO: NEGATIVE for weakness, dizziness or paresthesias  PSYCHIATRIC: NEGATIVE for changes in mood or affect      OBJECTIVE:   There were no vitals taken for this visit.  Physical Exam  GENERAL: healthy, alert and no distress  EYES: Eyes grossly normal to inspection, PERRL and conjunctivae and sclerae normal  HENT: ear canals and TM's normal, nose and mouth without ulcers or lesions  NECK: no adenopathy, no asymmetry, masses, or scars and thyroid normal to palpation  RESP: lungs clear to auscultation - no rales, rhonchi or wheezes  BREAST: normal without masses, tenderness or nipple discharge and no palpable axillary masses or adenopathy  CV: regular rate and rhythm, normal S1 S2, no S3 or S4, no murmur, click or rub, no peripheral edema and peripheral pulses strong  ABDOMEN: soft, nontender, no hepatosplenomegaly, no masses and bowel sounds normal  MS: no gross musculoskeletal defects noted, no edema  SKIN: no suspicious lesions or rashes  NEURO: Normal  "strength and tone, mentation intact and speech normal  PSYCH: mentation appears normal, affect normal/bright  Declined pelvic exam.  Small red area right wrist, large several cm rash shiny and red on right lower anterior leg    Diagnostic Test Results:  Labs reviewed in Epic  No results found for any visits on 10/31/23.    ASSESSMENT/PLAN:   1. Encounter for routine history and physical exam in female patient    - VENOUS COLLECTION  - Lipid Panel (BFP)    2. Benign essential hypertension  Check labs, refilled medications.  - Comprehensive Metobolic Panel (BFP)  - lisinopril-hydrochlorothiazide (ZESTORETIC) 20-25 MG tablet; Take 1 tablet by mouth daily  Dispense: 90 tablet; Refill: 3    3. Rash  Advised to see dermatology. She will call one on her own. Possible psoriasis.     Patient has been advised of split billing requirements and indicates understanding: Yes      COUNSELING:  Reviewed preventive health counseling, as reflected in patient instructions       Regular exercise       Healthy diet/nutrition      BMI:   Estimated body mass index is 41.8 kg/m  as calculated from the following:    Height as of 1/10/23: 1.74 m (5' 8.5\").    Weight as of 1/10/23: 126.6 kg (279 lb).   Weight management plan: Discussed healthy diet and exercise guidelines      She reports that she has never smoked. She has never used smokeless tobacco.          Unique Jensen MD  [facility]  "

## 2023-10-17 ENCOUNTER — HOSPITAL ENCOUNTER (OUTPATIENT)
Dept: CARDIOLOGY | Facility: CLINIC | Age: 58
Discharge: HOME | End: 2023-10-17
Attending: INTERNAL MEDICINE | Admitting: INTERNAL MEDICINE
Payer: COMMERCIAL

## 2023-10-17 DIAGNOSIS — R00.2 PALPITATIONS: ICD-10-CM

## 2023-10-17 LAB — LVEF ECHO: NORMAL

## 2023-10-17 PROCEDURE — 93306 TTE W/DOPPLER COMPLETE: CPT

## 2023-10-17 PROCEDURE — 93306 TTE W/DOPPLER COMPLETE: CPT | Mod: 26 | Performed by: INTERNAL MEDICINE

## 2023-10-31 ENCOUNTER — OFFICE VISIT (OUTPATIENT)
Dept: FAMILY MEDICINE | Facility: CLINIC | Age: 58
End: 2023-10-31

## 2023-10-31 VITALS
SYSTOLIC BLOOD PRESSURE: 134 MMHG | OXYGEN SATURATION: 97 % | HEART RATE: 73 BPM | BODY MASS INDEX: 40.73 KG/M2 | TEMPERATURE: 97.6 F | HEIGHT: 69 IN | WEIGHT: 275 LBS | DIASTOLIC BLOOD PRESSURE: 80 MMHG

## 2023-10-31 DIAGNOSIS — Z00.00 ENCOUNTER FOR ROUTINE HISTORY AND PHYSICAL EXAM IN FEMALE PATIENT: Primary | ICD-10-CM

## 2023-10-31 DIAGNOSIS — R21 RASH: ICD-10-CM

## 2023-10-31 DIAGNOSIS — R73.09 HIGH GLUCOSE: ICD-10-CM

## 2023-10-31 DIAGNOSIS — I10 BENIGN ESSENTIAL HYPERTENSION: ICD-10-CM

## 2023-10-31 LAB
ALBUMIN SERPL-MCNC: 4.5 G/DL (ref 3.6–5.1)
ALBUMIN/GLOB SERPL: 1.7 {RATIO} (ref 1–2.5)
ALP SERPL-CCNC: 83 U/L (ref 33–130)
ALT 1742-6: 25 U/L (ref 0–32)
AST 1920-8: 14 U/L (ref 0–35)
BILIRUB SERPL-MCNC: 0.7 MG/DL (ref 0.2–1.2)
BUN SERPL-MCNC: 17 MG/DL (ref 7–25)
BUN/CREATININE RATIO: 15.9 (ref 6–32)
CALCIUM SERPL-MCNC: 9.2 MG/DL (ref 8.6–10.3)
CHLORIDE SERPLBLD-SCNC: 104.2 MMOL/L (ref 98–110)
CHOLEST SERPL-MCNC: 185 MG/DL (ref 0–199)
CHOLEST/HDLC SERPL: 4 {RATIO} (ref 0–5)
CO2 SERPL-SCNC: 27.8 MMOL/L (ref 20–32)
CREAT SERPL-MCNC: 1.07 MG/DL (ref 0.6–1.3)
GLOBULIN, CALCULATED - QUEST: 2.6 (ref 1.9–3.7)
GLUCOSE SERPL-MCNC: 127 MG/DL (ref 60–99)
HBA1C MFR BLD: 6 % (ref 4–7)
HDLC SERPL-MCNC: 49 MG/DL (ref 40–150)
LDLC SERPL CALC-MCNC: 107 MG/DL (ref 0–130)
POTASSIUM SERPL-SCNC: 4.39 MMOL/L (ref 3.5–5.3)
PROT SERPL-MCNC: 7.1 G/DL (ref 6.1–8.1)
SODIUM SERPL-SCNC: 141.9 MMOL/L (ref 135–146)
TRIGL SERPL-MCNC: 144 MG/DL (ref 0–149)

## 2023-10-31 PROCEDURE — 36415 COLL VENOUS BLD VENIPUNCTURE: CPT | Performed by: FAMILY MEDICINE

## 2023-10-31 PROCEDURE — 83036 HEMOGLOBIN GLYCOSYLATED A1C: CPT | Performed by: FAMILY MEDICINE

## 2023-10-31 PROCEDURE — 80061 LIPID PANEL: CPT | Performed by: FAMILY MEDICINE

## 2023-10-31 PROCEDURE — 99396 PREV VISIT EST AGE 40-64: CPT | Performed by: FAMILY MEDICINE

## 2023-10-31 PROCEDURE — 80053 COMPREHEN METABOLIC PANEL: CPT | Performed by: FAMILY MEDICINE

## 2023-10-31 RX ORDER — LISINOPRIL AND HYDROCHLOROTHIAZIDE 20; 25 MG/1; MG/1
1 TABLET ORAL DAILY
Qty: 90 TABLET | Refills: 3 | Status: SHIPPED | OUTPATIENT
Start: 2023-10-31 | End: 2023-11-01

## 2023-10-31 NOTE — NURSING NOTE
Chief Complaint   Patient presents with    Physical     Fasting today, cardiology put orders in future      Pre-visit Screening:  Immunizations:  up to date  Colonoscopy:  is up to date  Mammogram: is up to date  Asthma Action Test/Plan:  NA  PHQ9:  NA  GAD7:  NA  Questioned patient about current smoking habits Pt. has never smoked.  Ok to leave detailed message on voice mail for today's visit only Yes, phone # [phone]

## 2023-11-12 ENCOUNTER — OFFICE VISIT (OUTPATIENT)
Dept: FAMILY MEDICINE | Facility: CLINIC | Age: 58
End: 2023-11-12
Payer: COMMERCIAL

## 2023-11-12 VITALS
TEMPERATURE: 97.1 F | DIASTOLIC BLOOD PRESSURE: 70 MMHG | SYSTOLIC BLOOD PRESSURE: 105 MMHG | OXYGEN SATURATION: 97 % | WEIGHT: 275 LBS | BODY MASS INDEX: 41.21 KG/M2 | HEART RATE: 73 BPM

## 2023-11-12 DIAGNOSIS — L30.9 DERMATITIS: Primary | ICD-10-CM

## 2023-11-12 PROCEDURE — 99213 OFFICE O/P EST LOW 20 MIN: CPT

## 2023-11-12 RX ORDER — TRIAMCINOLONE ACETONIDE 1 MG/G
CREAM TOPICAL 2 TIMES DAILY
Qty: 45 G | Refills: 1 | Status: SHIPPED | OUTPATIENT
Start: 2023-11-12 | End: 2023-11-26

## 2023-11-12 NOTE — PROGRESS NOTES
Assessment & Plan     Dermatitis    - triamcinolone (KENALOG) 0.1 % external cream  Dispense: 45 g; Refill: 1       Patient Instructions   Apply triamcinolone cream light coating twice daily.    Can try benedryl one tab at bedtime, and zyrtec one tab in am for itching (generic is fine).    Use mild soap like dove or basis soap. Shower as soon as possible after swimming.     Eucerin, Aquaphor, or CereVe cream in between for skin dryness.    Observe for signs of infection: Increased redness, swelling or drainage, fever/sweats/chills - go to urgent care or ER.    Return in about 2 weeks (around 11/26/2023), or if symptoms worsen or fail to improve.    [facility] [address]    Erin Martinez is a 58 year old female who presents to clinic today for the following health issues:  Chief Complaint   Patient presents with     Rash     Face and right flank onset 4 days ago.      Patient reports that she started to get a rash on her face, neck, chest and right side abdomen 3 days ago. It is itchy. She has a new body wash that she was using. She also has a rash on her right lower leg and right hand - this rash she has had longer and seemed to appear after swimming in a pool. No fever/sweats/chills. No burning sensation. She has tried an over the counter cortisone cream on her leg and hand and that helped some.              Objective    /70 (BP Location: Right arm, Patient Position: Sitting, Cuff Size: Adult Large)   Pulse 73   Temp 97.1  F (36.2  C) (Tympanic)   Wt 124.7 kg (275 lb)   SpO2 97%   BMI 41.21 kg/m    Physical Exam  Vitals and nursing note reviewed.   Constitutional:       Appearance: Normal appearance.   HENT:      Head: Normocephalic and atraumatic.   Pulmonary:      Effort: Pulmonary effort is normal.   Skin:     General: Skin is warm and dry.      Comments: Macular papular rash face - right lower cheek, chin, left lower  cheek, lower neck, upper chest, right mid abdomen, right anterior calf of leg and small spot on right hand. No open areas, no drainage or swelling.    Neurological:      General: No focal deficit present.      Mental Status: She is alert and oriented to person, place, and time.   Psychiatric:         Mood and Affect: Mood normal.         Behavior: Behavior normal.         Thought Content: Thought content normal.         Judgment: Judgment normal.

## 2023-11-12 NOTE — PATIENT INSTRUCTIONS
Apply triamcinolone cream light coating twice daily.    Can try benedryl one tab at bedtime, and zyrtec one tab in am for itching (generic is fine).    Use mild soap like dove or basis soap. Shower as soon as possible after swimming.     Eucerin, Aquaphor, or CereVe cream in between for skin dryness.    Observe for signs of infection: Increased redness, swelling or drainage, fever/sweats/chills - go to urgent care or ER.

## 2024-03-22 ENCOUNTER — OFFICE VISIT (OUTPATIENT)
Dept: FAMILY MEDICINE | Facility: CLINIC | Age: 59
End: 2024-03-22

## 2024-03-22 VITALS
WEIGHT: 275 LBS | OXYGEN SATURATION: 98 % | BODY MASS INDEX: 40.73 KG/M2 | TEMPERATURE: 97 F | SYSTOLIC BLOOD PRESSURE: 110 MMHG | HEIGHT: 69 IN | DIASTOLIC BLOOD PRESSURE: 72 MMHG | HEART RATE: 99 BPM

## 2024-03-22 DIAGNOSIS — J06.9 VIRAL URI: Primary | ICD-10-CM

## 2024-03-22 DIAGNOSIS — R05.1 ACUTE COUGH: ICD-10-CM

## 2024-03-22 LAB
FLUAV AG UPPER RESP QL IA.RAPID: NORMAL
FLUBV AG UPPER RESP QL IA.RAPID: NORMAL
RESPIRATORY SYNCYTIAL VIRUS: NEGATIVE

## 2024-03-22 PROCEDURE — 99213 OFFICE O/P EST LOW 20 MIN: CPT

## 2024-03-22 PROCEDURE — 87804 INFLUENZA ASSAY W/OPTIC: CPT

## 2024-03-22 PROCEDURE — 87634 RSV DNA/RNA AMP PROBE: CPT

## 2024-03-22 RX ORDER — BENZONATATE 100 MG/1
100 CAPSULE ORAL 3 TIMES DAILY PRN
Qty: 30 CAPSULE | Refills: 0 | Status: SHIPPED | OUTPATIENT
Start: 2024-03-22 | End: 2024-04-03

## 2024-03-22 NOTE — NURSING NOTE
Chief Complaint   Patient presents with    Cough     Cough started Monday night that has gotten worse. No sore throat, no congestion. Her ribs hurt from coughing. No headaches, or body aches. Test for Covid yesterday and was negative. No fever.    Pre-visit Screening:  Immunizations:  up to date  Colonoscopy:  will be due soon  Mammogram: is up to date  Asthma Action Test/Plan:  na  PHQ9:  -  GAD7:  -  Questioned patient about current smoking habits Pt. has never smoked.  Ok to leave detailed message on voice mail for today's visit only yes, phone # [phone]

## 2024-03-22 NOTE — PROGRESS NOTES
Assessment & Plan     1. Viral URI  - Discussed with Michelle her symptoms and physical exam are most consistent with a viral URI. Influenza and RSV are both negative. Encouraged patient to continue to rest, push plenty of fluids, Dayquil/Nyquil and tea with honey for cough, also will RX Tessalon Pearles TID PRN as patient notes these help her cough, saline nasal rinses for congestion, etc. Expect to see gradual improvement in the next week. Return to clinic and ER precautions discussed.   - benzonatate (TESSALON) 100 MG capsule; Take 1 capsule (100 mg) by mouth 3 times daily as needed for cough  Dispense: 30 capsule; Refill: 0    2. Acute cough  - See above.   - Influenza A and B (BFP)  - Respiratory Syncytial Virus - RSV  - benzonatate (TESSALON) 100 MG capsule; Take 1 capsule (100 mg) by mouth 3 times daily as needed for cough  Dispense: 30 capsule; Refill: 0    Follow up as needed. Reasons to follow-up sooner or seek emergent care reviewed.     Kayla Quinones PA-C  [facility]       Subjective     Michelle Grant is a 59 year old female who presents to clinic today for the following health issues:    HPI   Chief Complaint   Patient presents with     Cough     Cough started Monday night that has gotten worse. No sore throat, no congestion. Her ribs hurt from coughing. No headaches, or body aches. Test for Covid yesterday and was negative. No fever.      Michelle presents for concerns of an illness. She notes her symptoms started on Monday night this week (03/18/24) with symptoms of a dry cough and nasal congestion. She notes the cough has been getting worse and she is starting to experience rib pain from coughing so hard. She denies any symptoms of fevers/chills, body aches, ear pain, sore throat, SOB, wheezing, chest pain, abdominal pain, nausea/vomiting, or diarrhea. She can still sleep through the night but at times the cough will wake her up. She took an at home COVID test yesterday and it was  "negative. 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 She has been taking Nyquil, Mucinex, and cough drops for her cough. She is also using a humidifier.     Daily medications reviewed by me. Patient is a non-smoker and does not have a history of any lung disease.       Objective    /72 (BP Location: Left arm, Patient Position: Sitting, Cuff Size: Adult Large)   Pulse 99   Temp 97  F (36.1  C) (Temporal)   Ht 1.74 m (5' 8.5\")   Wt 124.7 kg (275 lb)   SpO2 98%   BMI 41.21 kg/m    Body mass index is 41.21 kg/m .    Physical Examination:  GENERAL: healthy, alert and no distress  EYES: Eyes grossly normal to inspection, PERRL and conjunctivae and sclerae normal  HENT: ear canals and TM's normal, nasal congestion present, mouth without ulcers or lesions  NECK: no adenopathy, no asymmetry, masses, or scars and thyroid normal to palpation  RESP: lungs clear to auscultation - no rales, rhonchi or wheezes  CV: regular rate and rhythm, normal S1 S2, no S3 or S4, no murmur, click or rub, no peripheral edema   ABDOMEN: soft and non-tender  MS: no gross musculoskeletal defects noted, no edema  SKIN: no suspicious lesions or rashes  NEURO: Normal strength and tone, mentation intact and speech normal  PSYCH: mentation appears normal, affect normal/bright    Labs reviewed.  Results for orders placed or performed in visit on 03/22/24 (from the past 24 hour(s))   Influenza A and B (BFP)   Result Value Ref Range    Influenza A neg neg    Influenza B neg neg   Respiratory Syncytial Virus - RSV   Result Value Ref Range    RESPIRATORY SYNCYTIAL VIRUS Negative      "

## 2024-04-03 ENCOUNTER — MYC REFILL (OUTPATIENT)
Dept: FAMILY MEDICINE | Facility: CLINIC | Age: 59
End: 2024-04-03

## 2024-04-03 DIAGNOSIS — J06.9 VIRAL URI: ICD-10-CM

## 2024-04-03 DIAGNOSIS — R05.1 ACUTE COUGH: ICD-10-CM

## 2024-04-04 RX ORDER — BENZONATATE 100 MG/1
100 CAPSULE ORAL 3 TIMES DAILY PRN
Qty: 30 CAPSULE | Refills: 0 | Status: SHIPPED | OUTPATIENT
Start: 2024-04-04

## 2024-04-29 ENCOUNTER — TRANSFERRED RECORDS (OUTPATIENT)
Dept: FAMILY MEDICINE | Facility: CLINIC | Age: 59
End: 2024-04-29

## 2024-06-19 ENCOUNTER — HOSPITAL ENCOUNTER (OUTPATIENT)
Dept: MAMMOGRAPHY | Facility: CLINIC | Age: 59
Discharge: HOME | End: 2024-06-19
Attending: FAMILY MEDICINE | Admitting: FAMILY MEDICINE
Payer: COMMERCIAL

## 2024-06-19 DIAGNOSIS — Z12.31 VISIT FOR SCREENING MAMMOGRAM: ICD-10-CM

## 2024-06-19 PROCEDURE — 77063 BREAST TOMOSYNTHESIS BI: CPT

## 2024-12-21 ENCOUNTER — HEALTH MAINTENANCE LETTER (OUTPATIENT)
Age: 59
End: 2024-12-21

## (undated) DEVICE — DRSG TELFA 2X3"

## (undated) DEVICE — PEN MARKING SKIN

## (undated) DEVICE — SU MONOCRYL 4-0 PS-2 18" UND Y496G

## (undated) DEVICE — DRAPE LAP W/ARMBOARD 29410

## (undated) DEVICE — DRSG STERI STRIP 1/4X3" R1541

## (undated) DEVICE — GLOVE PROTEXIS BLUE W/NEU-THERA 6.5  2D73EB65

## (undated) DEVICE — ESU GROUND PAD ADULT W/CORD E7507

## (undated) DEVICE — PREP POVIDONE IODINE SOLUTION 10% 4OZ BOTTLE 29906-004

## (undated) DEVICE — BAG CLEAR TRASH 1.3M 39X33" P4040C

## (undated) DEVICE — LINEN HALF SHEET 5512

## (undated) DEVICE — SU VICRYL 3-0 SH 27" UND J416H

## (undated) DEVICE — PAD CHUX UNDERPAD 30X36" P3036C

## (undated) DEVICE — SU VICRYL 3-0 TIE 12X18" J904T

## (undated) DEVICE — PREP SKIN SCRUB TRAY 4461A

## (undated) DEVICE — LINEN TOWEL PACK X10 5473

## (undated) DEVICE — PACK MINOR CUSTOM RIDGES SBA32RMRMA

## (undated) DEVICE — DRSG TEGADERM 4X4 3/4" 1626W

## (undated) DEVICE — SOL NACL 0.9% IRRIG 1000ML BOTTLE 2F7124

## (undated) DEVICE — GLOVE PROTEXIS POWDER FREE 6.5 ORTHOPEDIC 2D73ET65

## (undated) DEVICE — LINEN FULL SHEET 5511

## (undated) DEVICE — PREP POVIDONE-IODINE 7.5% SCRUB 4OZ BOTTLE MDS093945

## (undated) RX ORDER — CEFAZOLIN SODIUM IN 0.9 % NACL 3 G/100 ML
INTRAVENOUS SOLUTION, PIGGYBACK (ML) INTRAVENOUS
Status: DISPENSED
Start: 2022-02-28

## (undated) RX ORDER — CEFAZOLIN SODIUM 500 MG/2.2ML
INJECTION, POWDER, FOR SOLUTION INTRAMUSCULAR; INTRAVENOUS
Status: DISPENSED
Start: 2022-02-28

## (undated) RX ORDER — LIDOCAINE HYDROCHLORIDE 10 MG/ML
INJECTION, SOLUTION EPIDURAL; INFILTRATION; INTRACAUDAL; PERINEURAL
Status: DISPENSED
Start: 2022-02-28

## (undated) RX ORDER — FENTANYL CITRATE 50 UG/ML
INJECTION, SOLUTION INTRAMUSCULAR; INTRAVENOUS
Status: DISPENSED
Start: 2022-02-28

## (undated) RX ORDER — BUPIVACAINE HYDROCHLORIDE 2.5 MG/ML
INJECTION, SOLUTION EPIDURAL; INFILTRATION; INTRACAUDAL
Status: DISPENSED
Start: 2022-02-28

## (undated) RX ORDER — GINSENG 100 MG
CAPSULE ORAL
Status: DISPENSED
Start: 2022-02-28

## (undated) RX ORDER — DEXAMETHASONE SODIUM PHOSPHATE 4 MG/ML
INJECTION, SOLUTION INTRA-ARTICULAR; INTRALESIONAL; INTRAMUSCULAR; INTRAVENOUS; SOFT TISSUE
Status: DISPENSED
Start: 2022-02-28